# Patient Record
Sex: FEMALE | Race: OTHER | Employment: FULL TIME | URBAN - METROPOLITAN AREA
[De-identification: names, ages, dates, MRNs, and addresses within clinical notes are randomized per-mention and may not be internally consistent; named-entity substitution may affect disease eponyms.]

---

## 2023-11-09 ENCOUNTER — OFFICE VISIT (OUTPATIENT)
Dept: PRIMARY CARE CLINIC | Age: 23
End: 2023-11-09

## 2023-11-09 VITALS — DIASTOLIC BLOOD PRESSURE: 62 MMHG | WEIGHT: 189 LBS | TEMPERATURE: 98.1 F | SYSTOLIC BLOOD PRESSURE: 118 MMHG

## 2023-11-09 DIAGNOSIS — Z34.90 PREGNANCY, UNSPECIFIED GESTATIONAL AGE: ICD-10-CM

## 2023-11-09 DIAGNOSIS — L05.91 PILONIDAL CYST: ICD-10-CM

## 2023-11-09 DIAGNOSIS — F33.1 MODERATE EPISODE OF RECURRENT MAJOR DEPRESSIVE DISORDER (HCC): ICD-10-CM

## 2023-11-09 DIAGNOSIS — F90.9 ATTENTION DEFICIT HYPERACTIVITY DISORDER (ADHD), UNSPECIFIED ADHD TYPE: ICD-10-CM

## 2023-11-09 DIAGNOSIS — F41.9 ANXIETY: Primary | ICD-10-CM

## 2023-11-09 RX ORDER — BUPROPION HYDROCHLORIDE 450 MG/1
TABLET, FILM COATED, EXTENDED RELEASE ORAL EVERY MORNING
COMMUNITY
Start: 2023-07-08

## 2023-11-09 ASSESSMENT — PATIENT HEALTH QUESTIONNAIRE - PHQ9
SUM OF ALL RESPONSES TO PHQ QUESTIONS 1-9: 6
6. FEELING BAD ABOUT YOURSELF - OR THAT YOU ARE A FAILURE OR HAVE LET YOURSELF OR YOUR FAMILY DOWN: 0
2. FEELING DOWN, DEPRESSED OR HOPELESS: 0
SUM OF ALL RESPONSES TO PHQ QUESTIONS 1-9: 6
SUM OF ALL RESPONSES TO PHQ9 QUESTIONS 1 & 2: 1
5. POOR APPETITE OR OVEREATING: 1
7. TROUBLE CONCENTRATING ON THINGS, SUCH AS READING THE NEWSPAPER OR WATCHING TELEVISION: 2
SUM OF ALL RESPONSES TO PHQ QUESTIONS 1-9: 6
1. LITTLE INTEREST OR PLEASURE IN DOING THINGS: 1
4. FEELING TIRED OR HAVING LITTLE ENERGY: 2
9. THOUGHTS THAT YOU WOULD BE BETTER OFF DEAD, OR OF HURTING YOURSELF: 0
10. IF YOU CHECKED OFF ANY PROBLEMS, HOW DIFFICULT HAVE THESE PROBLEMS MADE IT FOR YOU TO DO YOUR WORK, TAKE CARE OF THINGS AT HOME, OR GET ALONG WITH OTHER PEOPLE: 1
3. TROUBLE FALLING OR STAYING ASLEEP: 0
8. MOVING OR SPEAKING SO SLOWLY THAT OTHER PEOPLE COULD HAVE NOTICED. OR THE OPPOSITE, BEING SO FIGETY OR RESTLESS THAT YOU HAVE BEEN MOVING AROUND A LOT MORE THAN USUAL: 0
SUM OF ALL RESPONSES TO PHQ QUESTIONS 1-9: 6

## 2023-11-09 ASSESSMENT — ANXIETY QUESTIONNAIRES
IF YOU CHECKED OFF ANY PROBLEMS ON THIS QUESTIONNAIRE, HOW DIFFICULT HAVE THESE PROBLEMS MADE IT FOR YOU TO DO YOUR WORK, TAKE CARE OF THINGS AT HOME, OR GET ALONG WITH OTHER PEOPLE: SOMEWHAT DIFFICULT
1. FEELING NERVOUS, ANXIOUS, OR ON EDGE: 1
GAD7 TOTAL SCORE: 6
5. BEING SO RESTLESS THAT IT IS HARD TO SIT STILL: 1
3. WORRYING TOO MUCH ABOUT DIFFERENT THINGS: 1
7. FEELING AFRAID AS IF SOMETHING AWFUL MIGHT HAPPEN: 0
6. BECOMING EASILY ANNOYED OR IRRITABLE: 1
2. NOT BEING ABLE TO STOP OR CONTROL WORRYING: 1
4. TROUBLE RELAXING: 1

## 2023-11-09 NOTE — PROGRESS NOTES
mouth every morning      lamoTRIgine (LAMICTAL) 200 MG tablet Take 1 tablet by mouth         Past Medical History:   Diagnosis Date    ADHD     Anxiety     Depression        Past Surgical History:   Procedure Laterality Date    WISDOM TOOTH EXTRACTION             Problem Relation Age of Onset    Diabetes type 2  Father     Cancer Sister     Ulcerative Colitis Brother        Social History     Socioeconomic History    Marital status: Single     Spouse name: Not on file    Number of children: Not on file    Years of education: Not on file    Highest education level: Not on file   Occupational History    Not on file   Tobacco Use    Smoking status: Never    Smokeless tobacco: Never   Substance and Sexual Activity    Alcohol use: Not Currently    Drug use: Never    Sexual activity: Not on file   Other Topics Concern    Not on file   Social History Narrative    Not on file     Social Determinants of Health     Financial Resource Strain: Not on file   Food Insecurity: Not on file   Transportation Needs: Not on file   Physical Activity: Not on file   Stress: Not on file   Social Connections: Not on file   Intimate Partner Violence: Not on file   Housing Stability: Not on file          Objective:     Constitutional:   Reviewed vitals above  Well Nourished, well developed, no distress       HENT:  Normal external nose without lesions  Resp:  Normal effort  Clear to auscultation bilaterally without rhonchi, wheezing or crackles  Cardiovascular:   On auscultation, regular rate and rhythm without murmurs, rubs or gallops  No JVD  Gastrointestinal:  Nontender, nondistended, and no masses  No hepatosplenomegaly  Musculoskeletal:  Normal Gait  All extremities without clubbing, cyanosis or edema  Skin:  No rashes on inspection  No areas of increased heat or induration on palpation  Psych:  Normal mood and affect  Normal insight and judgement    Assessment / Plan:   Patient is a 69-year-old female with a past medical history of

## 2023-11-09 NOTE — PATIENT INSTRUCTIONS
[facility] Obstetrics and Gynecology - Cortez Good DO, [address] [place] [address], [facility] [address]   Phone: [phone]

## 2023-11-20 ENCOUNTER — OFFICE VISIT (OUTPATIENT)
Dept: OBGYN CLINIC | Age: 23
End: 2023-11-20

## 2023-11-20 VITALS
OXYGEN SATURATION: 100 % | WEIGHT: 189 LBS | SYSTOLIC BLOOD PRESSURE: 115 MMHG | HEIGHT: 69 IN | BODY MASS INDEX: 27.99 KG/M2 | TEMPERATURE: 97 F | DIASTOLIC BLOOD PRESSURE: 70 MMHG | HEART RATE: 90 BPM

## 2023-11-20 DIAGNOSIS — Z01.419 WOMEN'S ANNUAL ROUTINE GYNECOLOGICAL EXAMINATION: Primary | ICD-10-CM

## 2023-11-20 DIAGNOSIS — F33.1 MODERATE EPISODE OF RECURRENT MAJOR DEPRESSIVE DISORDER (HCC): ICD-10-CM

## 2023-11-20 DIAGNOSIS — O21.9 NAUSEA AND VOMITING DURING PREGNANCY: ICD-10-CM

## 2023-11-20 DIAGNOSIS — Z76.89 ENCOUNTER TO ESTABLISH CARE WITH NEW DOCTOR: ICD-10-CM

## 2023-11-20 DIAGNOSIS — Z20.2 POSSIBLE EXPOSURE TO STD: ICD-10-CM

## 2023-11-20 DIAGNOSIS — F41.9 ANXIETY: ICD-10-CM

## 2023-11-20 DIAGNOSIS — N91.2 AMENORRHEA: ICD-10-CM

## 2023-11-20 DIAGNOSIS — Z32.01 POSITIVE PREGNANCY TEST: ICD-10-CM

## 2023-11-20 LAB
CONTROL: NORMAL
PREGNANCY TEST URINE, POC: POSITIVE

## 2023-11-20 RX ORDER — ONDANSETRON 4 MG/1
4 TABLET, FILM COATED ORAL EVERY 8 HOURS PRN
Qty: 20 TABLET | Refills: 0 | Status: SHIPPED | OUTPATIENT
Start: 2023-11-20

## 2023-11-20 ASSESSMENT — ENCOUNTER SYMPTOMS
ABDOMINAL PAIN: 0
COLOR CHANGE: 0
VOMITING: 0
NAUSEA: 1
SHORTNESS OF BREATH: 0
PHOTOPHOBIA: 0

## 2023-11-20 NOTE — PROGRESS NOTES
[facility] Ob/Gyn   Annual Exam     CC:   Chief Complaint   Patient presents with    Annual Exam     Annual: Amenorrhea LMP: 9/22/23 Last Pap 8/2022        HPI:  24 y.o. G0 presents for her gynecologic exam.   She complains of Amenorrhea. Patient's last menstrual period was 09/22/2023 (exact date). Was using [employer] for family planning. Has had a (+) home pregnancy test.   Denies any VB / minor cramping since that time. Some nausea/ only one episode of vomiting. Eats once in am and has high gag reflex with medications   Lamictal 400 mg QD, Wellbutrin 450 mg QD, L - methylfolate 10mg, Trazodone 10-20mg as needed. Has psychiatrist at home in [place]. Does have a have MTHFR -- unsure   Works at [employer], psychiatric nurse. Is doing well w/ prenatal vitamin. Previous pregnancies: None. In May 2023 she had a late period, control line didn't show up, bled heavily before she could retake preg test     Screening:  Last pap smear: 1/5/23 -- normal per patient   History of abnormal pap smears: Y   6/25/2021 -- AGC   7/21/21 -- Colpo negative   STI History: Denies      Review of Systems:   Review of Systems   Constitutional:  Negative for activity change, appetite change and fatigue. Eyes:  Negative for photophobia and visual disturbance. Respiratory:  Negative for shortness of breath. Cardiovascular:  Negative for chest pain, palpitations and leg swelling. Gastrointestinal:  Positive for nausea. Negative for abdominal pain and vomiting. Genitourinary:  Negative for difficulty urinating.        (+) absence of Menses   (+) breast tenderness    Skin:  Negative for color change. Neurological:  Negative for dizziness and numbness. Hematological:  Negative for adenopathy. Does not bruise/bleed easily. Psychiatric/Behavioral:  Negative for behavioral problems. The patient is not nervous/anxious.         Primary Care Physician: Darlyn Carmona DO    Obstetric History  OB History   No obstetric

## 2023-11-21 LAB
BACTERIA URNS QL MICRO: ABNORMAL /HPF
BILIRUB UR QL STRIP.AUTO: NEGATIVE
CANDIDA DNA VAG QL NAA+PROBE: NORMAL
CLARITY UR: CLEAR
COLOR UR: YELLOW
EPI CELLS #/AREA URNS AUTO: 7 /HPF (ref 0–5)
G VAGINALIS DNA SPEC QL NAA+PROBE: NORMAL
GLUCOSE UR STRIP.AUTO-MCNC: NEGATIVE MG/DL
HGB UR QL STRIP.AUTO: NEGATIVE
HYALINE CASTS #/AREA URNS AUTO: 0 /LPF (ref 0–8)
KETONES UR STRIP.AUTO-MCNC: NEGATIVE MG/DL
LEUKOCYTE ESTERASE UR QL STRIP.AUTO: NEGATIVE
NITRITE UR QL STRIP.AUTO: NEGATIVE
PH UR STRIP.AUTO: 6 [PH] (ref 5–8)
PROT UR STRIP.AUTO-MCNC: NEGATIVE MG/DL
RBC CLUMPS #/AREA URNS AUTO: 1 /HPF (ref 0–4)
SP GR UR STRIP.AUTO: 1.02 (ref 1–1.03)
T VAGINALIS DNA VAG QL NAA+PROBE: NORMAL
UA DIPSTICK W REFLEX MICRO PNL UR: ABNORMAL
URN SPEC COLLECT METH UR: ABNORMAL
UROBILINOGEN UR STRIP-ACNC: 1 E.U./DL
WBC #/AREA URNS AUTO: 3 /HPF (ref 0–5)

## 2023-11-22 LAB
BACTERIA UR CULT: NORMAL
C TRACH DNA CVX QL NAA+PROBE: NEGATIVE
N GONORRHOEA DNA CERV MUCUS QL NAA+PROBE: NEGATIVE

## 2023-11-29 ENCOUNTER — TELEPHONE (OUTPATIENT)
Dept: OBGYN CLINIC | Age: 23
End: 2023-11-29

## 2023-11-29 DIAGNOSIS — O21.9 NAUSEA AND VOMITING DURING PREGNANCY: Primary | ICD-10-CM

## 2023-11-29 RX ORDER — DOXYLAMINE SUCCINATE AND PYRIDOXINE HYDROCHLORIDE, DELAYED RELEASE TABLETS 10 MG/10 MG 10; 10 MG/1; MG/1
1 TABLET, DELAYED RELEASE ORAL 3 TIMES DAILY PRN
Qty: 60 TABLET | Refills: 2 | Status: SHIPPED | OUTPATIENT
Start: 2023-11-29

## 2023-11-29 NOTE — TELEPHONE ENCOUNTER
I think she needs to try diclegis   It is expensive but worth it   Ill place rx for this now     An alternative would be phenergan suppositories if she does not / cannot fill diclegis

## 2023-11-29 NOTE — TELEPHONE ENCOUNTER
Patient calling with continued nausea. She has the zofran and is able to swallow it and keep it down, however when she tries to take her scheduled medications she cannot keep them down d/t the pill is larger. She is wanting to know if there is anything else that she can take for the nausea? She is currently drinking a boost to help since she is so nauseated. Once she is able to add meals back in she will. Routing to Dr. Christina Connelly.

## 2023-12-29 ENCOUNTER — INITIAL PRENATAL (OUTPATIENT)
Dept: OBGYN CLINIC | Age: 23
End: 2023-12-29

## 2023-12-29 VITALS
TEMPERATURE: 97.9 F | DIASTOLIC BLOOD PRESSURE: 65 MMHG | HEART RATE: 79 BPM | OXYGEN SATURATION: 98 % | BODY MASS INDEX: 27.91 KG/M2 | WEIGHT: 189 LBS | SYSTOLIC BLOOD PRESSURE: 118 MMHG

## 2023-12-29 DIAGNOSIS — R06.02 SHORTNESS OF BREATH DUE TO PREGNANCY: ICD-10-CM

## 2023-12-29 DIAGNOSIS — O26.899 SHORTNESS OF BREATH DUE TO PREGNANCY: ICD-10-CM

## 2023-12-29 DIAGNOSIS — F41.9 ANXIETY: ICD-10-CM

## 2023-12-29 DIAGNOSIS — F33.1 MODERATE EPISODE OF RECURRENT MAJOR DEPRESSIVE DISORDER (HCC): ICD-10-CM

## 2023-12-29 DIAGNOSIS — Z34.01 PRENATAL CARE, FIRST PREGNANCY IN FIRST TRIMESTER: Primary | ICD-10-CM

## 2023-12-29 DIAGNOSIS — O21.9 NAUSEA AND VOMITING DURING PREGNANCY: ICD-10-CM

## 2023-12-30 LAB
ABO + RH BLD: NORMAL
AMPHETAMINES UR QL SCN>1000 NG/ML: NORMAL
BARBITURATES UR QL SCN>200 NG/ML: NORMAL
BASOPHILS # BLD: 0 K/UL (ref 0–0.2)
BASOPHILS NFR BLD: 0.5 %
BENZODIAZ UR QL SCN>200 NG/ML: NORMAL
BLD GP AB SCN SERPL QL: NORMAL
BUPRENORPHINE+NOR UR QL SCN: NORMAL
CANNABINOIDS UR QL SCN>50 NG/ML: NORMAL
COCAINE UR QL SCN: NORMAL
DEPRECATED RDW RBC AUTO: 13.6 % (ref 12.4–15.4)
DRUG SCREEN COMMENT UR-IMP: NORMAL
EOSINOPHIL # BLD: 0.1 K/UL (ref 0–0.6)
EOSINOPHIL NFR BLD: 1.2 %
FENTANYL SCREEN, URINE: NORMAL
HBV SURFACE AG SERPL QL IA: NORMAL
HCT VFR BLD AUTO: 35.4 % (ref 36–48)
HGB BLD-MCNC: 12.2 G/DL (ref 12–16)
HIV 1+2 AB+HIV1 P24 AG SERPL QL IA: NORMAL
HIV 2 AB SERPL QL IA: NORMAL
HIV1 AB SERPL QL IA: NORMAL
HIV1 P24 AG SERPL QL IA: NORMAL
LYMPHOCYTES # BLD: 1 K/UL (ref 1–5.1)
LYMPHOCYTES NFR BLD: 16.3 %
MCH RBC QN AUTO: 28.6 PG (ref 26–34)
MCHC RBC AUTO-ENTMCNC: 34.5 G/DL (ref 31–36)
MCV RBC AUTO: 82.9 FL (ref 80–100)
METHADONE UR QL SCN>300 NG/ML: NORMAL
MONOCYTES # BLD: 0.5 K/UL (ref 0–1.3)
MONOCYTES NFR BLD: 8.9 %
NEUTROPHILS # BLD: 4.5 K/UL (ref 1.7–7.7)
NEUTROPHILS NFR BLD: 73.1 %
OPIATES UR QL SCN>300 NG/ML: NORMAL
OXYCODONE UR QL SCN: NORMAL
PCP UR QL SCN>25 NG/ML: NORMAL
PH UR STRIP: 8 [PH]
PLATELET # BLD AUTO: 203 K/UL (ref 135–450)
PMV BLD AUTO: 9 FL (ref 5–10.5)
RBC # BLD AUTO: 4.27 M/UL (ref 4–5.2)
REAGIN+T PALLIDUM IGG+IGM SERPL-IMP: NORMAL
RUBV IGG SERPL IA-ACNC: 46.6 IU/ML
VZV IGG SER QL IA: NORMAL
WBC # BLD AUTO: 6.1 K/UL (ref 4–11)

## 2023-12-31 LAB
HCV AB S/CO SERPL IA: 0.05 IV
HCV AB SERPL QL IA: NEGATIVE

## 2024-01-19 SDOH — ECONOMIC STABILITY: FOOD INSECURITY: WITHIN THE PAST 12 MONTHS, YOU WORRIED THAT YOUR FOOD WOULD RUN OUT BEFORE YOU GOT MONEY TO BUY MORE.: NEVER TRUE

## 2024-01-19 SDOH — ECONOMIC STABILITY: TRANSPORTATION INSECURITY
IN THE PAST 12 MONTHS, HAS LACK OF TRANSPORTATION KEPT YOU FROM MEETINGS, WORK, OR FROM GETTING THINGS NEEDED FOR DAILY LIVING?: NO

## 2024-01-19 SDOH — ECONOMIC STABILITY: INCOME INSECURITY: HOW HARD IS IT FOR YOU TO PAY FOR THE VERY BASICS LIKE FOOD, HOUSING, MEDICAL CARE, AND HEATING?: NOT VERY HARD

## 2024-01-19 SDOH — ECONOMIC STABILITY: HOUSING INSECURITY
IN THE LAST 12 MONTHS, WAS THERE A TIME WHEN YOU DID NOT HAVE A STEADY PLACE TO SLEEP OR SLEPT IN A SHELTER (INCLUDING NOW)?: NO

## 2024-01-19 SDOH — ECONOMIC STABILITY: FOOD INSECURITY: WITHIN THE PAST 12 MONTHS, THE FOOD YOU BOUGHT JUST DIDN'T LAST AND YOU DIDN'T HAVE MONEY TO GET MORE.: NEVER TRUE

## 2024-01-22 ENCOUNTER — ROUTINE PRENATAL (OUTPATIENT)
Dept: OBGYN CLINIC | Age: 24
End: 2024-01-22

## 2024-01-22 VITALS
DIASTOLIC BLOOD PRESSURE: 76 MMHG | WEIGHT: 179 LBS | HEIGHT: 70 IN | TEMPERATURE: 97.2 F | BODY MASS INDEX: 25.62 KG/M2 | SYSTOLIC BLOOD PRESSURE: 120 MMHG | HEART RATE: 82 BPM

## 2024-01-22 DIAGNOSIS — Z34.02 PRENATAL CARE, FIRST PREGNANCY IN SECOND TRIMESTER: Primary | ICD-10-CM

## 2024-01-22 DIAGNOSIS — F41.9 ANXIETY: ICD-10-CM

## 2024-01-22 DIAGNOSIS — O26.899 SHORTNESS OF BREATH DUE TO PREGNANCY: ICD-10-CM

## 2024-01-22 DIAGNOSIS — R06.02 SHORTNESS OF BREATH DUE TO PREGNANCY: ICD-10-CM

## 2024-01-22 DIAGNOSIS — F33.1 MODERATE EPISODE OF RECURRENT MAJOR DEPRESSIVE DISORDER (HCC): ICD-10-CM

## 2024-01-22 DIAGNOSIS — O21.9 NAUSEA AND VOMITING DURING PREGNANCY: ICD-10-CM

## 2024-01-22 PROCEDURE — 0502F SUBSEQUENT PRENATAL CARE: CPT | Performed by: OBSTETRICS & GYNECOLOGY
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
## 2024-02-06 ENCOUNTER — TELEPHONE (OUTPATIENT)
Dept: PRIMARY CARE CLINIC | Age: 24
End: 2024-02-06

## 2024-02-06 NOTE — TELEPHONE ENCOUNTER
Patient call stated she having pain patient is 18 weeks pregnant with a pilonidal cyst and think it maybe inflammation please advise patient             [phone] (home)

## 2024-02-12 ENCOUNTER — OFFICE VISIT (OUTPATIENT)
Dept: PRIMARY CARE CLINIC | Age: 24
End: 2024-02-12
Payer: COMMERCIAL

## 2024-02-12 VITALS
HEART RATE: 86 BPM | BODY MASS INDEX: 26.81 KG/M2 | SYSTOLIC BLOOD PRESSURE: 112 MMHG | DIASTOLIC BLOOD PRESSURE: 70 MMHG | OXYGEN SATURATION: 99 % | WEIGHT: 181 LBS | HEIGHT: 69 IN | TEMPERATURE: 98.1 F

## 2024-02-12 DIAGNOSIS — L05.91 PILONIDAL CYST: Primary | ICD-10-CM

## 2024-02-12 PROCEDURE — 99213 OFFICE O/P EST LOW 20 MIN: CPT

## 2024-02-12 NOTE — PROGRESS NOTES
PROGRESS NOTE   [facility]                                  [address], [address], [place] [address]         Phone: [phone]    Date of Service:  2/12/2024     Patient ID: Kathya Causey is a 23 y.o. female      Subjective:     CC: ***    HPI    5. Pilonidal cyst  - Stable  - Not actively draining or painful  - Given her pregnancy we will hold off on referral to general surgery for excision at this time.    ROS:    Negative expect for pertinent positives listed in the HPI        There were no vitals filed for this visit.    Allergies:  Patient has no known allergies.    No outpatient medications have been marked as taking for the 2/12/24 encounter (Appointment) with Alyssa Gibson DO.         Objective:   {physical exam FCMRP:51727}    Constitutional:   Reviewed vitals above  Well Nourished, well developed, no distress       HENT:  Normal external nose without lesions  Bilateral TMs translucent with normal light reflex and bony landmarks  Normal oropharynx without erythema or exudate  Normal nasal mucosa without swelling or erythema  Neck:  Symmetric and without masses  No thyromegaly  Resp:  Normal effort  Clear to auscultation bilaterally without rhonchi, wheezing or crackles  Cardiovascular:  On auscultation, regular rate and rhythm without murmurs, rubs or gallops  No JVD  Gastrointestinal:  Nontender, nondistended, and no masses  No hepatosplenomegaly  Musculoskeletal:  Normal Gait  All extremities without clubbing, cyanosis or edema  Skin:  No rashes on inspection  No areas of increased heat or induration on palpation  Psych:  Normal mood and affect  Normal insight and judgement        Assessment / Plan:     There are no diagnoses linked to this encounter.      Health Maitenance: ***    Health care decision maker:  {health care decision maker allegra:64818}  Vot-ER:  {vo+ER allegra:21889}      {resident attestation to

## 2024-02-12 NOTE — PROGRESS NOTES
PROGRESS NOTE   [facility]                                  [address], [address], [place] [address]         Phone: [phone]    Date of Service:  2/12/2024     Patient ID: Kathya Causey is a 23 y.o. female    A physical examination was conducted with the presence of a chaperone, a medical assistant Soraida, in the office.     Subjective:     CC:   Chief Complaint   Patient presents with    Follow-up         HPI  Alyssa Causey 23 y.o.  female with past medical history of pilonidal cyst presents to the office with discomfort in the coccygeal area.     The patient reported being 19 weeks pregnant.     Coccygeal Discomfort  The patient reported a history of a pilonidal cyst.  The last flare-up involving cyst infection occurred in August 2023, and it was drained at that time. A prior drainage was performed in Winter 2022.  Recurrent pain and discomfort began approximately 10 days ago.  The pain is described as throbbing, with an intensity of 4-5/10. Certain sitting positions or leaning against something exacerbate the pain, while relieving pressure on the affected area alleviates the pain.  The patient denies any discharge, fever, chills, shortness of breath, cough, chest pain, abdominal pain, or symptoms of vomiting or diarrhea or other symptoms.   Nausea has been reported, attributed to pregnancy.  Patient denies any injury or trauma.       ROS:    Pertinent positives and negatives in HPI.         Vitals:    02/12/24 0927   BP: 112/70   Site: Right Upper Arm   Position: Sitting   Cuff Size: Medium Adult   Pulse: 86   Temp: 98.1 °F (36.7 °C)   SpO2: 99%   Weight: 82.1 kg (181 lb)   Height: 1.753 m (5' 9.02\")       Allergies:  Patient has no known allergies.    Outpatient Medications Marked as Taking for the 2/12/24 encounter (Office Visit) with Jean Pierre Pereira, DO   Medication Sig Dispense Refill    doxylamine-pyridoxine 10-10 MG

## 2024-02-26 ENCOUNTER — ROUTINE PRENATAL (OUTPATIENT)
Dept: OBGYN CLINIC | Age: 24
End: 2024-02-26

## 2024-02-26 VITALS
TEMPERATURE: 97 F | BODY MASS INDEX: 26.72 KG/M2 | HEIGHT: 69 IN | OXYGEN SATURATION: 97 % | DIASTOLIC BLOOD PRESSURE: 70 MMHG | SYSTOLIC BLOOD PRESSURE: 118 MMHG | HEART RATE: 85 BPM | WEIGHT: 180.4 LBS

## 2024-02-26 DIAGNOSIS — O26.899 SHORTNESS OF BREATH DUE TO PREGNANCY: ICD-10-CM

## 2024-02-26 DIAGNOSIS — F41.9 ANXIETY: ICD-10-CM

## 2024-02-26 DIAGNOSIS — Z34.02 PRENATAL CARE, FIRST PREGNANCY IN SECOND TRIMESTER: Primary | ICD-10-CM

## 2024-02-26 DIAGNOSIS — F33.1 MODERATE EPISODE OF RECURRENT MAJOR DEPRESSIVE DISORDER (HCC): ICD-10-CM

## 2024-02-26 DIAGNOSIS — O21.9 NAUSEA AND VOMITING DURING PREGNANCY: ICD-10-CM

## 2024-02-26 DIAGNOSIS — R06.02 SHORTNESS OF BREATH DUE TO PREGNANCY: ICD-10-CM

## 2024-02-26 PROCEDURE — 0502F SUBSEQUENT PRENATAL CARE: CPT | Performed by: OBSTETRICS & GYNECOLOGY
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
## 2024-02-27 ENCOUNTER — INITIAL CONSULT (OUTPATIENT)
Dept: SURGERY | Age: 24
End: 2024-02-27

## 2024-02-27 VITALS
DIASTOLIC BLOOD PRESSURE: 70 MMHG | WEIGHT: 182.6 LBS | SYSTOLIC BLOOD PRESSURE: 118 MMHG | BODY MASS INDEX: 27.05 KG/M2 | HEIGHT: 69 IN

## 2024-02-27 DIAGNOSIS — L02.31 ABSCESS OF GLUTEAL CLEFT: Primary | ICD-10-CM

## 2024-02-28 NOTE — PROGRESS NOTES
New Patient Consult    [facility]  [facility] General Surgery [place]  Bartolo Camacho MD    [address], [address]  [place], [place] [address]  Phone: [phone]  Fax: [phone]    Alyssa Causey   YOB: 2000    Date of Visit:  2024    Alyssa Gonzalez DO    HPI:   Abscess: 24 y/o WF referred by Ob/Gyn (Dr Sanderson)  - she is currently 21 weeks pregnant - with h/o two prior episodes of a focal abscess on the left buttock adjacent to the kylie cleft.  Was last seen at an Urgent Care, had an I&D, and was told she had a pilonidal cyst.  This area has fully healed but she is referred to assess for possible need for surgical intervention in the future.  She currently has no complaints of pain or drainage at the area.    No Known Allergies  Outpatient Medications Marked as Taking for the 24 encounter (Initial consult) with Bartolo Camacho MD   Medication Sig Dispense Refill    doxylamine-pyridoxine 10-10 MG TBEC Take 1 tablet by mouth 3 times daily as needed (nausea) Start with 1 tablet at night and in am, add afternoon dose as needed 60 tablet 2    buPROPion HCl ER, XL, 450 MG TB24 Take by mouth every morning      LAMOTRIGINE PO Take 400 mg by mouth daily         Past Medical History:   Diagnosis Date    ADHD     Anxiety     Depression      Past Surgical History:   Procedure Laterality Date    WISDOM TOOTH EXTRACTION       Family History   Problem Relation Age of Onset    Diabetes type 2  Father     Cancer Sister     Ulcerative Colitis Brother      Social History     Socioeconomic History    Marital status:      Spouse name: Not on file    Number of children: Not on file    Years of education: Not on file    Highest education level: Not on file   Occupational History    Not on file   Tobacco Use    Smoking status: Never    Smokeless tobacco: Never   Substance and Sexual Activity    Alcohol use: Not Currently    Drug use: Never    Sexual activity: Not on file

## 2024-03-22 ENCOUNTER — ROUTINE PRENATAL (OUTPATIENT)
Dept: OBGYN CLINIC | Age: 24
End: 2024-03-22

## 2024-03-22 VITALS
HEART RATE: 90 BPM | DIASTOLIC BLOOD PRESSURE: 64 MMHG | TEMPERATURE: 97.9 F | WEIGHT: 186 LBS | BODY MASS INDEX: 27.45 KG/M2 | SYSTOLIC BLOOD PRESSURE: 118 MMHG

## 2024-03-22 DIAGNOSIS — O21.9 NAUSEA AND VOMITING DURING PREGNANCY: ICD-10-CM

## 2024-03-22 DIAGNOSIS — F33.1 MODERATE EPISODE OF RECURRENT MAJOR DEPRESSIVE DISORDER (HCC): ICD-10-CM

## 2024-03-22 DIAGNOSIS — Z34.02 PRENATAL CARE, FIRST PREGNANCY IN SECOND TRIMESTER: Primary | ICD-10-CM

## 2024-03-22 DIAGNOSIS — F41.9 ANXIETY: ICD-10-CM
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
## 2024-04-26 ENCOUNTER — ROUTINE PRENATAL (OUTPATIENT)
Dept: OBGYN CLINIC | Age: 24
End: 2024-04-26

## 2024-04-26 VITALS
HEART RATE: 76 BPM | TEMPERATURE: 98.8 F | DIASTOLIC BLOOD PRESSURE: 70 MMHG | BODY MASS INDEX: 28.49 KG/M2 | SYSTOLIC BLOOD PRESSURE: 116 MMHG | WEIGHT: 193 LBS

## 2024-04-26 DIAGNOSIS — F41.9 ANXIETY: ICD-10-CM

## 2024-04-26 DIAGNOSIS — O26.899 SHORTNESS OF BREATH DUE TO PREGNANCY: ICD-10-CM

## 2024-04-26 DIAGNOSIS — Z23 NEED FOR DIPHTHERIA-TETANUS-PERTUSSIS (TDAP) VACCINE: ICD-10-CM

## 2024-04-26 DIAGNOSIS — O21.9 NAUSEA AND VOMITING DURING PREGNANCY: ICD-10-CM

## 2024-04-26 DIAGNOSIS — Z34.93 PRENATAL CARE IN THIRD TRIMESTER: Primary | ICD-10-CM

## 2024-04-26 DIAGNOSIS — R06.02 SHORTNESS OF BREATH DUE TO PREGNANCY: ICD-10-CM

## 2024-04-26 DIAGNOSIS — F33.1 MODERATE EPISODE OF RECURRENT MAJOR DEPRESSIVE DISORDER (HCC): ICD-10-CM

## 2024-04-26 LAB
BASOPHILS # BLD: 0 K/UL (ref 0–0.2)
BASOPHILS NFR BLD: 0.6 %
DEPRECATED RDW RBC AUTO: 13.3 % (ref 12.4–15.4)
EOSINOPHIL # BLD: 0.1 K/UL (ref 0–0.6)
EOSINOPHIL NFR BLD: 0.9 %
HCT VFR BLD AUTO: 32.1 % (ref 36–48)
HGB BLD-MCNC: 11.1 G/DL (ref 12–16)
LYMPHOCYTES # BLD: 1 K/UL (ref 1–5.1)
LYMPHOCYTES NFR BLD: 12.9 %
MCH RBC QN AUTO: 29.7 PG (ref 26–34)
MCHC RBC AUTO-ENTMCNC: 34.7 G/DL (ref 31–36)
MCV RBC AUTO: 85.8 FL (ref 80–100)
MONOCYTES # BLD: 0.5 K/UL (ref 0–1.3)
MONOCYTES NFR BLD: 6.4 %
NEUTROPHILS # BLD: 6 K/UL (ref 1.7–7.7)
NEUTROPHILS NFR BLD: 79.2 %
PLATELET # BLD AUTO: 200 K/UL (ref 135–450)
PMV BLD AUTO: 8.7 FL (ref 5–10.5)
RBC # BLD AUTO: 3.74 M/UL (ref 4–5.2)
WBC # BLD AUTO: 7.6 K/UL (ref 4–11)

## 2024-04-26 NOTE — PROGRESS NOTES
.eastobgynglucose    Patient started drink at 10:17am  and finished at 10:20am   .  Patient tolerated drink well.   1 green tube drawn at 11:20am.  # 50 grams of Glucola.  No complain of nausea and vomiting at this time, will continue to monitor.     10:44 AM Given Tdap (Adacel) vaccine 0.5mL IM  Site:Right deltoid.   Lot # [id number]  Expiration Date:  05/082026  NDC # 99226-590-76 .  Patient tolerated well. No reaction noted after 20 minutes. VIS sheet provided.  Administered by: Vangie Rocha   
precautions reviewed   Return in about 2 weeks (around 5/10/2024) for Return OB visit.    Aby Sanderson DO

## 2024-04-27 LAB — GLUCOSE 1H P 50 G GLC PO SERPL-MCNC: 99 MG/DL

## 2024-05-07 ENCOUNTER — ROUTINE PRENATAL (OUTPATIENT)
Dept: OBGYN CLINIC | Age: 24
End: 2024-05-07

## 2024-05-07 VITALS
TEMPERATURE: 98 F | DIASTOLIC BLOOD PRESSURE: 70 MMHG | WEIGHT: 195 LBS | SYSTOLIC BLOOD PRESSURE: 112 MMHG | BODY MASS INDEX: 28.78 KG/M2 | HEART RATE: 111 BPM

## 2024-05-07 DIAGNOSIS — O21.9 NAUSEA AND VOMITING DURING PREGNANCY: ICD-10-CM

## 2024-05-07 DIAGNOSIS — Z34.93 PRENATAL CARE IN THIRD TRIMESTER: Primary | ICD-10-CM

## 2024-05-07 DIAGNOSIS — O26.899 SHORTNESS OF BREATH DUE TO PREGNANCY: ICD-10-CM

## 2024-05-07 DIAGNOSIS — R06.02 SHORTNESS OF BREATH DUE TO PREGNANCY: ICD-10-CM

## 2024-05-07 DIAGNOSIS — F33.1 MODERATE EPISODE OF RECURRENT MAJOR DEPRESSIVE DISORDER (HCC): ICD-10-CM

## 2024-05-07 DIAGNOSIS — F41.9 ANXIETY: ICD-10-CM

## 2024-05-07 DIAGNOSIS — L30.9 ECZEMA, UNSPECIFIED TYPE: ICD-10-CM

## 2024-05-07 PROCEDURE — 0502F SUBSEQUENT PRENATAL CARE: CPT | Performed by: OBSTETRICS & GYNECOLOGY
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
## 2024-05-09 ENCOUNTER — TELEPHONE (OUTPATIENT)
Dept: OBGYN CLINIC | Age: 24
End: 2024-05-09

## 2024-05-09 NOTE — TELEPHONE ENCOUNTER
Pt was called and is aware of Physician response. Recent Tdap was done 2 weeks ago and 3 yrs prior to that. She has placed atb ointment. DONE
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
## 2024-05-16 ENCOUNTER — TELEPHONE (OUTPATIENT)
Dept: OBGYN CLINIC | Age: 24
End: 2024-05-16

## 2024-05-21 ENCOUNTER — ROUTINE PRENATAL (OUTPATIENT)
Dept: OBGYN CLINIC | Age: 24
End: 2024-05-21

## 2024-05-21 VITALS
BODY MASS INDEX: 29.92 KG/M2 | TEMPERATURE: 97.8 F | OXYGEN SATURATION: 97 % | HEIGHT: 69 IN | WEIGHT: 202 LBS | SYSTOLIC BLOOD PRESSURE: 128 MMHG | DIASTOLIC BLOOD PRESSURE: 68 MMHG | HEART RATE: 107 BPM

## 2024-05-21 DIAGNOSIS — O21.9 NAUSEA AND VOMITING DURING PREGNANCY: ICD-10-CM

## 2024-05-21 DIAGNOSIS — F33.1 MODERATE EPISODE OF RECURRENT MAJOR DEPRESSIVE DISORDER (HCC): ICD-10-CM

## 2024-05-21 DIAGNOSIS — Z34.93 PRENATAL CARE IN THIRD TRIMESTER: Primary | ICD-10-CM

## 2024-05-21 PROCEDURE — 0502F SUBSEQUENT PRENATAL CARE: CPT | Performed by: OBSTETRICS & GYNECOLOGY

## 2024-05-21 NOTE — PROGRESS NOTES
Return OB Office Visit    CC:   Chief Complaint   Patient presents with    Routine Prenatal Visit       HPI:  Pt seen and examined. No concerns/complaints. Denies VB, LOF, ctx. +FM. Some increased discharge and maybe occasional Hunker-Nash as well.     Objective:  /68   Pulse (!) 107   Temp 97.8 °F (36.6 °C) (Temporal)   Ht 1.753 m (5' 9.02\")   Wt 91.6 kg (202 lb)   LMP 2023 (Exact Date)   SpO2 97%   BMI 29.81 kg/m²   Gen: AO, NAD  Abd: Soft, NT  FHT: 147  FH: 32cm, unk by Leopolds    Assessment/Plan:  23 y.o.  at 33w1d (Estimated Date of Delivery: 24) presents for CARLOS appointment:      Diagnosis Orders   1. Prenatal care in third trimester        2. Nausea and vomiting during pregnancy        3. Moderate episode of recurrent major depressive disorder (HCC)          Doing well today, routine care  - FWB reassuring on doptones today  - labor precautions discussed, qeustions answered  - moods stable for now    Dispo: RTC in 2 weeks  Macie Roca MD

## 2024-05-29 ENCOUNTER — TELEPHONE (OUTPATIENT)
Dept: OBGYN CLINIC | Age: 24
End: 2024-05-29

## 2024-05-29 NOTE — TELEPHONE ENCOUNTER
34w2d. Pt calling because she has had SOB and felt slightly dizzy. This has now passed . She was at work when it happened and checked her B/P @ 147/79. She states her baseline is 111/70's. She waited 10 minutes and rechecked and got the same. +FM, No LOF. She states she had cramping like contractions in the am for about an hour but none since. She denies nausea , and has been able to eat ok. Pt advised to monitor B/P and hydrate well with water. Advised if B/P staying > 140/90 and feeling dizzy or headache would need Triage evaluation. Pt ok with monitoring for now and will call back if episode occurs again.

## 2024-05-30 ENCOUNTER — HOSPITAL ENCOUNTER (OUTPATIENT)
Age: 24
Discharge: HOME | End: 2024-05-30
Attending: OBSTETRICS & GYNECOLOGY | Admitting: OBSTETRICS & GYNECOLOGY
Payer: COMMERCIAL

## 2024-05-30 VITALS
SYSTOLIC BLOOD PRESSURE: 131 MMHG | RESPIRATION RATE: 18 BRPM | DIASTOLIC BLOOD PRESSURE: 68 MMHG | OXYGEN SATURATION: 97 % | HEART RATE: 92 BPM | TEMPERATURE: 98.3 F

## 2024-05-30 LAB
ALBUMIN SERPL-MCNC: 3.5 G/DL (ref 3.4–5)
ALBUMIN/GLOB SERPL: 1.3 {RATIO} (ref 1.1–2.2)
ALP SERPL-CCNC: 101 U/L (ref 40–129)
ALT SERPL-CCNC: 12 U/L (ref 10–40)
ANION GAP SERPL CALCULATED.3IONS-SCNC: 15 MMOL/L (ref 3–16)
AST SERPL-CCNC: 18 U/L (ref 15–37)
BASOPHILS # BLD: 0 K/UL (ref 0–0.2)
BASOPHILS NFR BLD: 0.3 %
BILIRUB SERPL-MCNC: 0.6 MG/DL (ref 0–1)
BUN SERPL-MCNC: 6 MG/DL (ref 7–20)
CALCIUM SERPL-MCNC: 9 MG/DL (ref 8.3–10.6)
CHLORIDE SERPL-SCNC: 99 MMOL/L (ref 99–110)
CO2 SERPL-SCNC: 19 MMOL/L (ref 21–32)
CREAT SERPL-MCNC: <0.5 MG/DL (ref 0.6–1.1)
DEPRECATED RDW RBC AUTO: 13.1 % (ref 12.4–15.4)
EOSINOPHIL # BLD: 0.1 K/UL (ref 0–0.6)
EOSINOPHIL NFR BLD: 1 %
GFR SERPLBLD CREATININE-BSD FMLA CKD-EPI: >90 ML/MIN/{1.73_M2}
GLUCOSE SERPL-MCNC: 79 MG/DL (ref 70–99)
HCT VFR BLD AUTO: 30.2 % (ref 36–48)
HGB BLD-MCNC: 10.3 G/DL (ref 12–16)
LYMPHOCYTES # BLD: 1.5 K/UL (ref 1–5.1)
LYMPHOCYTES NFR BLD: 12.2 %
MCH RBC QN AUTO: 27.6 PG (ref 26–34)
MCHC RBC AUTO-ENTMCNC: 34.1 G/DL (ref 31–36)
MCV RBC AUTO: 80.7 FL (ref 80–100)
MONOCYTES # BLD: 1 K/UL (ref 0–1.3)
MONOCYTES NFR BLD: 8.3 %
NEUTROPHILS # BLD: 9.5 K/UL (ref 1.7–7.7)
NEUTROPHILS NFR BLD: 78.2 %
PLATELET # BLD AUTO: 223 K/UL (ref 135–450)
PMV BLD AUTO: 8.1 FL (ref 5–10.5)
POTASSIUM SERPL-SCNC: 3.6 MMOL/L (ref 3.5–5.1)
PROT SERPL-MCNC: 6.2 G/DL (ref 6.4–8.2)
RBC # BLD AUTO: 3.74 M/UL (ref 4–5.2)
SODIUM SERPL-SCNC: 133 MMOL/L (ref 136–145)
URATE SERPL-MCNC: 3.9 MG/DL (ref 2.6–6)
WBC # BLD AUTO: 12.1 K/UL (ref 4–11)

## 2024-05-30 PROCEDURE — 84550 ASSAY OF BLOOD/URIC ACID: CPT

## 2024-05-30 PROCEDURE — 59025 FETAL NON-STRESS TEST: CPT | Performed by: OBSTETRICS & GYNECOLOGY

## 2024-05-30 PROCEDURE — 99214 OFFICE O/P EST MOD 30 MIN: CPT | Performed by: OBSTETRICS & GYNECOLOGY

## 2024-05-30 PROCEDURE — 80053 COMPREHEN METABOLIC PANEL: CPT

## 2024-05-30 PROCEDURE — 85025 COMPLETE CBC W/AUTO DIFF WBC: CPT

## 2024-05-30 PROCEDURE — 99211 OFF/OP EST MAY X REQ PHY/QHP: CPT

## 2024-05-30 PROCEDURE — 36415 COLL VENOUS BLD VENIPUNCTURE: CPT

## 2024-05-30 RX ORDER — DIPHENHYDRAMINE HCL 50 MG
50 CAPSULE ORAL EVERY 6 HOURS PRN
COMMUNITY

## 2024-05-30 RX ORDER — ACETAMINOPHEN 325 MG/1
650 TABLET ORAL EVERY 6 HOURS PRN
COMMUNITY

## 2024-05-30 RX ORDER — FERROUS SULFATE 325(65) MG
325 TABLET ORAL
Qty: 90 TABLET | Refills: 1 | Status: SHIPPED | OUTPATIENT
Start: 2024-05-30

## 2024-05-30 NOTE — H&P
Obstetrics Triage History and Physical    CC:   Chief Complaint   Patient presents with    Abdominal Pain     Right upper quadrant pain since 300 constant  7/10 on pain scale       HPI: Alyssa Causey is a 23 y.o.  at 34w3d who presents with complaints of upper abdominal pain.     Reports earlier today she was having upper abdomen (just above and to the right of her umbilicus) pain.  Worse with movement as well as with fetal movement.  +FM.  Denies VB, LOF, contractions.  Denies chest pain, shortness of breath, fever, chills, nausea, vomiting.  Denies headache, vision changes, RUQ pain, increased LE edema.      Reports an elevated BP yesterday, however improved on repeat.     Review of Systems: The following ROS was otherwise negative, except as noted in the HPI: constitutional, HEENT, respiratory, cardiovascular, gastrointestinal, genitourinary, skin, musculoskeletal, neurological, psych.     OBGYN Provider : BRAXTON Sanderson DO     Obstetrical History:  OB History    Para Term  AB Living   1 0 0 0 0 0   SAB IAB Ectopic Molar Multiple Live Births   0 0 0 0 0 0      # Outcome Date GA Lbr Jeff/2nd Weight Sex Delivery Anes PTL Lv   1 Current                Past Medical History:   Past Medical History:   Diagnosis Date    ADHD     Anxiety     Depression        Medications:  Prior to Admission medications    Medication Sig Start Date End Date Taking? Authorizing Provider   acetaminophen (TYLENOL) 325 MG tablet Take 2 tablets by mouth every 6 hours as needed for Pain   Yes Yi Walden MD   diphenhydrAMINE (BENADRYL) 50 MG capsule Take 1 capsule by mouth every 6 hours as needed for Itching   Yes ProviderYi MD   ferrous sulfate (IRON 325) 325 (65 Fe) MG tablet Take 1 tablet by mouth daily (with breakfast) 24  Yes Maria Victoria Fung DO   Prenatal MV-Min-Fe Fum-FA-DHA (PRENATAL 1 PO) Take by mouth    ProviderYi MD   doxylamine-pyridoxine 10-10 MG TBEC Take 1 tablet by 
1

## 2024-05-30 NOTE — TELEPHONE ENCOUNTER
Pt calling today because she has been having upper right sided sharp abdominal pain down to umbilicus area. Pt states she has been nauseated. She reports she has been hydrating well today. +FM and no LOF. She is concerned that despite rest and hydration the pain is not getting better. She reports it is 7/10 at times.   Please advise if triage evaluation is recommended

## 2024-05-30 NOTE — TELEPHONE ENCOUNTER
Attempted to call. Left detailed message to report to triage for evaluation. Called L&D to make aware of send over.

## 2024-05-31 NOTE — PROGRESS NOTES
Fetal Heart Monitor Interpretation:   Baseline: 125  Variability: moderate in degree  Accelerations: Present  Decelerations: Absent                  [place]: Contractions are absent     Category 1    Reactive: Yes      Maria Victoria Fung DO

## 2024-06-05 ENCOUNTER — HOSPITAL ENCOUNTER (OUTPATIENT)
Age: 24
Discharge: HOME | End: 2024-06-05
Attending: OBSTETRICS & GYNECOLOGY | Admitting: OBSTETRICS & GYNECOLOGY
Payer: COMMERCIAL

## 2024-06-05 ENCOUNTER — TELEPHONE (OUTPATIENT)
Dept: OBGYN CLINIC | Age: 24
End: 2024-06-05

## 2024-06-05 ENCOUNTER — ROUTINE PRENATAL (OUTPATIENT)
Dept: OBGYN CLINIC | Age: 24
End: 2024-06-05

## 2024-06-05 VITALS
HEART RATE: 90 BPM | OXYGEN SATURATION: 96 % | WEIGHT: 204 LBS | SYSTOLIC BLOOD PRESSURE: 120 MMHG | BODY MASS INDEX: 30.21 KG/M2 | DIASTOLIC BLOOD PRESSURE: 74 MMHG | HEIGHT: 69 IN | TEMPERATURE: 98.2 F | RESPIRATION RATE: 22 BRPM

## 2024-06-05 VITALS
SYSTOLIC BLOOD PRESSURE: 110 MMHG | TEMPERATURE: 98 F | OXYGEN SATURATION: 99 % | DIASTOLIC BLOOD PRESSURE: 65 MMHG | HEART RATE: 110 BPM | WEIGHT: 204 LBS | BODY MASS INDEX: 30.11 KG/M2

## 2024-06-05 DIAGNOSIS — Z34.93 PRENATAL CARE IN THIRD TRIMESTER: Primary | ICD-10-CM

## 2024-06-05 DIAGNOSIS — F33.1 MODERATE EPISODE OF RECURRENT MAJOR DEPRESSIVE DISORDER (HCC): ICD-10-CM

## 2024-06-05 DIAGNOSIS — L30.9 ECZEMA, UNSPECIFIED TYPE: ICD-10-CM

## 2024-06-05 DIAGNOSIS — F41.9 ANXIETY: ICD-10-CM

## 2024-06-05 DIAGNOSIS — O21.9 NAUSEA AND VOMITING DURING PREGNANCY: ICD-10-CM

## 2024-06-05 PROBLEM — O46.93 VAGINAL BLEEDING IN PREGNANCY, THIRD TRIMESTER: Status: ACTIVE | Noted: 2024-06-05

## 2024-06-05 PROCEDURE — 99214 OFFICE O/P EST MOD 30 MIN: CPT | Performed by: OBSTETRICS & GYNECOLOGY

## 2024-06-05 PROCEDURE — 0502F SUBSEQUENT PRENATAL CARE: CPT | Performed by: OBSTETRICS & GYNECOLOGY

## 2024-06-05 PROCEDURE — 59025 FETAL NON-STRESS TEST: CPT | Performed by: OBSTETRICS & GYNECOLOGY

## 2024-06-05 PROCEDURE — 99213 OFFICE O/P EST LOW 20 MIN: CPT

## 2024-06-05 NOTE — PROGRESS NOTES
23 y.o.  at 35w2d EGA Estimated Date of Delivery: 24 here for CARLOS:     Pt seen and examined. No concerns/complaints.  Denies VB, LOF, CTX. Endorses (+) FM. Denies fevers / chills / chest pain / shortness of breath.   Denies HA, changes with vision, RUQ pain, edema.   MWQ reviewed.     Objective:  /65   Pulse (!) 110   Temp 98 °F (36.7 °C) (Infrared)   Wt 92.5 kg (204 lb)   LMP 2023 (Exact Date)   SpO2 99%   BMI 30.11 kg/m²   Gen: AO, NAD  Abd: Soft, NT, gravid   SVE: 1-/-3  Ext: Mild LE edema    Assessment/Plan:   Diagnosis Orders   1. Prenatal care in third trimester        2. Nausea and vomiting during pregnancy        3. Moderate episode of recurrent major depressive disorder (HCC)        4. Anxiety        5. Eczema, unspecified type            Diagnosis Orders   1. Prenatal care in third trimester     GBS collected today   RTC In 1 week   Boy- Stew, desires circumcision   2. Nausea and vomiting during pregnancy        3. Moderate episode of recurrent major depressive disorder (HCC)        4. Anxiety        5. Eczema, unspecified type          - reassuring maternal / fetal status     Follow Up  Return OB precautions reviewed   No follow-ups on file.    Approximately 10 minutes spent in room counseling patient on condition and coordination of care with over 50% in direct face to face counseling.     Ashley Moreno, DO

## 2024-06-05 NOTE — TELEPHONE ENCOUNTER
Patient was seen in office today and had her GBS swab and had a cervical check.     Patient calling with concerns of vaginal bleeding after getting home. Patient reports passing a vaginal blood clot and then saturating a pad with bright red blood from 3pm to now.     Discussed with DR Moreno, advised to report to L&D. Called  Dr Fung to give report. L&D aware.

## 2024-06-05 NOTE — PROGRESS NOTES
Pt presents to triage with c/o vaginal bleeding that began after her OB exam today. Pt had bright red and dark red blood of a pad. Pt states that she had a blood clot the size of half of a monico. Pt states that she did have a cervical exam today in the office. Her exam per pt 1-2 and 70. Pt has +FM and no other vaginal leakage of fluid.

## 2024-06-06 NOTE — PROGRESS NOTES
D/c instructions given. Pt verbalized understanding and denied questions. Pt left OB unit @ 2026 in stable condition, ambulatory and undelivered. Not in active labor.

## 2024-06-06 NOTE — H&P
Obstetrics History and Physical     CC:   Chief Complaint   Patient presents with    Vaginal Bleeding       HPI: Alyssa Causey is a 23 y.o.  at 35w2d who presents with complaints of vaginal bleeding.      +FM.  Reports some cramping prior to her exam today as well as vaginal bleeding after having her cervix checked this afternoon.  After she went home she passed a dime sized clot.  She placed a pad on and had approx 1/5 full after 2 hours.  Bleeding has significantly slowed since she has arrived in triage.  Some irregular cramping.  Denies LOF.  Denies headache, vision changes, RUQ pain, increased LE edema.      Pregnancy has been complicated by anxiety/depression and nausea and vomiting,     Review of Systems: The following ROS was otherwise negative, except as noted in the HPI: constitutional, HEENT, respiratory, cardiovascular, gastrointestinal, genitourinary, skin, musculoskeletal, neurological, psych    OBGYN Provider : BRAXTON Fung DO     Obstetrical History:  OB History    Para Term  AB Living   1 0 0 0 0 0   SAB IAB Ectopic Molar Multiple Live Births   0 0 0 0 0 0      # Outcome Date GA Lbr Jeff/2nd Weight Sex Delivery Anes PTL Lv   1 Current              Past Medical History:   Past Medical History:   Diagnosis Date    ADHD     Anxiety     Depression      Medications:  Prior to Admission medications    Medication Sig Start Date End Date Taking? Authorizing Provider   acetaminophen (TYLENOL) 325 MG tablet Take 2 tablets by mouth every 6 hours as needed for Pain    ProviderYi MD   diphenhydrAMINE (BENADRYL) 50 MG capsule Take 1 capsule by mouth every 6 hours as needed for Itching    ProviderYi MD   ferrous sulfate (IRON 325) 325 (65 Fe) MG tablet Take 1 tablet by mouth daily (with breakfast) 24   Maria Victoria Fung DO   Prenatal MV-Min-Fe Fum-FA-DHA (PRENATAL 1 PO) Take by mouth    ProviderYi MD   doxylamine-pyridoxine 10-10 MG TBEC Take 1 tablet by  mouth 3 times daily as needed (nausea) Start with 1 tablet at night and in am, add afternoon dose as needed 11/29/23   Aby Sanderson,    buPROPion HCl ER, XL, 450 MG TB24 Take by mouth every morning 7/8/23   ProviderYi MD   LAMOTRIGINE PO Take 400 mg by mouth daily 7/8/23   ProviderYi MD      Allergies:  Patient has no known allergies.    Surgical History:  Past Surgical History:   Procedure Laterality Date    WISDOM TOOTH EXTRACTION       Family History:  Family History   Problem Relation Age of Onset    Diabetes type 2  Father     Cancer Sister     Ulcerative Colitis Brother      Social History:  Social History     Substance and Sexual Activity   Alcohol Use Not Currently     Social History     Substance and Sexual Activity   Drug Use Never     Social History     Tobacco Use   Smoking Status Never   Smokeless Tobacco Never     Physical Exam:  /74   Pulse 90   Temp 98.2 °F (36.8 °C) (Oral)   Resp 22   Ht 1.753 m (5' 9\")   Wt 92.5 kg (204 lb)   LMP 09/22/2023 (Exact Date)   SpO2 96%   BMI 30.13 kg/m²   General: Alert, well appearing, no acute distress  Head: Normocephalic, atraumatic  Lungs: Clear to auscultation bilaterally without rales, rhonchi, wheezing  CV: Regular rate and regular rhythm, normal S1, S2 without murmurs, rubs, clicks or gallops.  Abdomen: Gravid, soft, non-tender, non-distended.  No uterine tenderness to palpation.  Pelvic: Normal appearing genitalia without masses, tenderness or lesions.  Friable cervix noted with no active bleeding.    Extremities: No redness or tenderness, neg Ghanshyam's sign  Skin: Well perfused, normal coloration and turgor, no lesions or rashes visualized  Neuro: Alert, oriented, normal speech, no focal deficits, moves extremities appropriately  Psych: Appropriate, normal affect, appears stated age    Fetal Monitoring Interpretation:   Baseline: 150  Variability: moderate in degree  Accelerations: Present  Decelerations: Absent

## 2024-06-06 NOTE — PROGRESS NOTES
Fetal Monitoring Interpretation:     Indication: Vaginal bleeding in pregnancy    Baseline: 150  Variability: moderate in degree  Accelerations: Present  Decelerations: Absent                      [place]: 2 contractions present    Category 1    Reactive: Yes      Impression: Reactive, reassuring    Maria Victoria Fung DO

## 2024-06-09 LAB — GP B STREP SPEC QL CULT: NORMAL

## 2024-06-12 ENCOUNTER — ROUTINE PRENATAL (OUTPATIENT)
Dept: OBGYN CLINIC | Age: 24
End: 2024-06-12

## 2024-06-12 VITALS
WEIGHT: 202.2 LBS | TEMPERATURE: 97.8 F | SYSTOLIC BLOOD PRESSURE: 126 MMHG | BODY MASS INDEX: 29.86 KG/M2 | DIASTOLIC BLOOD PRESSURE: 70 MMHG | HEART RATE: 92 BPM

## 2024-06-12 DIAGNOSIS — F41.9 ANXIETY: ICD-10-CM

## 2024-06-12 DIAGNOSIS — L30.9 ECZEMA, UNSPECIFIED TYPE: ICD-10-CM

## 2024-06-12 DIAGNOSIS — N89.8 VAGINAL DISCHARGE: ICD-10-CM

## 2024-06-12 DIAGNOSIS — O21.9 NAUSEA AND VOMITING DURING PREGNANCY: ICD-10-CM

## 2024-06-12 DIAGNOSIS — Z34.03 PRENATAL CARE, FIRST PREGNANCY, THIRD TRIMESTER: Primary | ICD-10-CM

## 2024-06-12 DIAGNOSIS — F33.1 MODERATE EPISODE OF RECURRENT MAJOR DEPRESSIVE DISORDER (HCC): ICD-10-CM

## 2024-06-12 PROCEDURE — 0502F SUBSEQUENT PRENATAL CARE: CPT | Performed by: OBSTETRICS & GYNECOLOGY

## 2024-06-12 NOTE — PROGRESS NOTES
Return OB Office Visit    CC:   Chief Complaint   Patient presents with    Routine Prenatal Visit       HPI:  Patient seen and examined.      Denies VB.  Reports is having increased discharge, states she has to convince herself every morning that her water has not broken.  Having some back pain, no regular ctx. +FM - has noticed more movement in the morning and evening, is still able to meet kick counts through the day.  Denies headaches, vision changes, RUQ pain, increased LE edema.  Denies chest pain, shortness of breath, fever, chills, nausea, vomiting.      Review of Systems: The following ROS was otherwise negative, except as noted in the HPI: constitutional, HEENT, respiratory, cardiovascular, gastrointestinal, genitourinary, skin, musculoskeletal, neurological, psych    Objective:  /70   Pulse 92   Temp 97.8 °F (36.6 °C) (Infrared)   Wt 91.7 kg (202 lb 3.2 oz)   LMP 09/22/2023 (Exact Date)   BMI 29.86 kg/m²     Physical Exam  Vitals reviewed. Exam conducted with a chaperone present.   Constitutional:       General: She is not in acute distress.     Appearance: She is well-developed.   HENT:      Head: Normocephalic and atraumatic.   Eyes:      Conjunctiva/sclera: Conjunctivae normal.   Cardiovascular:      Rate and Rhythm: Normal rate.   Pulmonary:      Effort: Pulmonary effort is normal. No respiratory distress.   Abdominal:      General: There is no distension.      Palpations: Abdomen is soft.      Tenderness: There is no abdominal tenderness. There is no guarding or rebound.   Genitourinary:     General: Normal vulva.      Exam position: Lithotomy position.      Labia:         Right: No rash, tenderness or lesion.         Left: No rash, tenderness or lesion.       Vagina: Vaginal discharge present.      Comments: Negative ferning and nitrazine.  Cervix 1-2/70/-3  Musculoskeletal:         General: No swelling.   Skin:     General: Skin is warm and dry.   Neurological:      Mental Status: She is

## 2024-06-13 PROBLEM — Z34.03 PRENATAL CARE, FIRST PREGNANCY, THIRD TRIMESTER: Status: ACTIVE | Noted: 2024-03-22

## 2024-06-13 LAB
CANDIDA DNA VAG QL NAA+PROBE: NORMAL
G VAGINALIS DNA SPEC QL NAA+PROBE: NORMAL
T VAGINALIS DNA VAG QL NAA+PROBE: NORMAL

## 2024-06-18 ENCOUNTER — TELEPHONE (OUTPATIENT)
Dept: OBGYN CLINIC | Age: 24
End: 2024-06-18

## 2024-06-18 ENCOUNTER — HOSPITAL ENCOUNTER (OUTPATIENT)
Age: 24
Discharge: HOME | End: 2024-06-18
Attending: OBSTETRICS & GYNECOLOGY | Admitting: OBSTETRICS & GYNECOLOGY
Payer: COMMERCIAL

## 2024-06-18 VITALS
BODY MASS INDEX: 29.92 KG/M2 | WEIGHT: 202 LBS | OXYGEN SATURATION: 98 % | HEIGHT: 69 IN | DIASTOLIC BLOOD PRESSURE: 80 MMHG | RESPIRATION RATE: 16 BRPM | TEMPERATURE: 97.8 F | SYSTOLIC BLOOD PRESSURE: 121 MMHG | HEART RATE: 103 BPM

## 2024-06-18 PROCEDURE — 99212 OFFICE O/P EST SF 10 MIN: CPT

## 2024-06-18 PROCEDURE — 99213 OFFICE O/P EST LOW 20 MIN: CPT | Performed by: OBSTETRICS & GYNECOLOGY

## 2024-06-18 NOTE — TELEPHONE ENCOUNTER
Pt called office again and states she has had about 5 contractions in the last hour. She reports feeling \"lots of pressure\". No LOF, + FM. States contractions are stopping from normal activity but more pressure than pain.Pt advised to report to Triage. Called L&D to make aware.  Routing to Dr Roca.

## 2024-06-18 NOTE — PROGRESS NOTES
Pt arrived to triage, placed in gown and placed on EFM, VS are WNL. Pt c/o contractions with pain 5 out of 10 and consistently occurring over the course of the day. Pt denies LOF, bleeding, or other concerns. Pt confirms kick count of 10x/hr. Pt admits to sexual intercourse last night. Will continue to monitor and notify OB.

## 2024-06-18 NOTE — TELEPHONE ENCOUNTER
37w1d. Pt calling with concern for contractions/ [place] pimentel. She says she has been having period like cramps that last night lasted for about 2 hours. She says it does ease up after a bit. Denies LOF, +FM Advised patient to monitor for contractions. If having more than 6 contractions in one hour, lasting longer than a minute, getting stronger to the point she has to stop what she is doing then report to labor and delivery for evaluation. Also advised to report to labor and delivery if she has decreased or no fetal movement or if she has any vaginal bleeding, leakage or gush of vaginal fluid.

## 2024-06-18 NOTE — PROGRESS NOTES
Call placed to Dr. Roca to inform her of pt's presence in T3, stated complaints and SVE. Order received to allow pt PO hydration, to come off monitors to ambulate in FBC and to repeat SVE in 1-2 hours.

## 2024-06-19 NOTE — H&P
Obstetrics Triage History and Physical    CC:   Chief Complaint   Patient presents with    Contractions       HPI: Alyssa Causey is a 23 y.o.  at 37w1d who presents with complaints of contractions.  Says they started around 1am, were more regular when she got here. Now have spaced out some but they feel more uncomfortable when she does have them. No VB, LOF. +FM.    Pregnancy has been complicated by anxiety/depression and nausea and vomiting.    Review of Systems: The following ROS was otherwise negative, except as noted in the HPI: constitutional, HEENT, respiratory, cardiovascular, gastrointestinal, genitourinary, skin, musculoskeletal, neurological, psych.     OBGYN Provider : Maria E    Obstetrical History:  OB History    Para Term  AB Living   1 0 0 0 0 0   SAB IAB Ectopic Molar Multiple Live Births   0 0 0 0 0 0      # Outcome Date GA Lbr Jeff/2nd Weight Sex Delivery Anes PTL Lv   1 Current                Past Medical History:   Past Medical History:   Diagnosis Date    ADHD     Anxiety     Depression        Medications:  No current facility-administered medications on file prior to encounter.     Current Outpatient Medications on File Prior to Encounter   Medication Sig Dispense Refill    acetaminophen (TYLENOL) 325 MG tablet Take 2 tablets by mouth every 6 hours as needed for Pain      diphenhydrAMINE (BENADRYL) 50 MG capsule Take 1 capsule by mouth every 6 hours as needed for Itching      ferrous sulfate (IRON 325) 325 (65 Fe) MG tablet Take 1 tablet by mouth daily (with breakfast) 90 tablet 1    Prenatal MV-Min-Fe Fum-FA-DHA (PRENATAL 1 PO) Take by mouth      doxylamine-pyridoxine 10-10 MG TBEC Take 1 tablet by mouth 3 times daily as needed (nausea) Start with 1 tablet at night and in am, add afternoon dose as needed (Patient not taking: Reported on 2024) 60 tablet 2    buPROPion HCl ER, XL, 450 MG TB24 Take by mouth every morning (Patient not taking: Reported on 2024)

## 2024-06-19 NOTE — PROGRESS NOTES
Pt verbalized understanding of verbal and written discharge instructions and denies having questions at this time. Pt left OB unit at 2054 ambulatory, undelivered, and in stable condition, accompanied by FOB. Patient is not in active labor.

## 2024-06-19 NOTE — TELEPHONE ENCOUNTER
I think you need to sign notes.    Patient seen in triage and discharged home, see Epic notes. Thanks.

## 2024-06-19 NOTE — DISCHARGE INSTRUCTIONS
continue to be pregnant.   Your water breaks, but labor does not start.      When should you call for help?   Call 911 anytime you think you may need emergency care. For example, call if:   You passed out (lost consciousness).   You have severe vaginal bleeding.   You have severe pain in your belly or pelvis.   You have had fluid gushing or leaking from your vagina and you know or think the umbilical cord is bulging into your vagina. If this happens, immediately get down on your knees so your rear end (buttocks) is higher than your head. This will decrease the pressure on the cord until help arrives.    Call your doctor now or seek immediate medical care if:   You have signs of preeclampsia, such as:   Sudden swelling of your face, hands, or feet.   New vision problems (such as dimness or blurring).   A severe headache.  You have change in amount or type of vaginal discharge  You have belly pain or cramping; low back or pelvic pressure; or pain up by your ribs that does not go away.  You have abdominal cramps that may be accompanied by diarrhea.  You have a fever- temperature of 100.6 or more.  You have had regular contractions (with or without pain) for an hour. This means that you have 8 or more within 1 hour or 4 or more in 20 minutes after you change your position and drink fluids.   You have a sudden release of fluid from the vagina.   You notice that your baby has stopped moving or is moving much less than normal- less than 5 times in 1 hour.  You have burning with urination, urgency or frequency

## 2024-06-20 ENCOUNTER — ROUTINE PRENATAL (OUTPATIENT)
Dept: OBGYN CLINIC | Age: 24
End: 2024-06-20

## 2024-06-20 VITALS
BODY MASS INDEX: 30.48 KG/M2 | DIASTOLIC BLOOD PRESSURE: 73 MMHG | SYSTOLIC BLOOD PRESSURE: 128 MMHG | HEART RATE: 88 BPM | WEIGHT: 206.4 LBS

## 2024-06-20 DIAGNOSIS — F41.9 ANXIETY: ICD-10-CM

## 2024-06-20 DIAGNOSIS — F33.1 MODERATE EPISODE OF RECURRENT MAJOR DEPRESSIVE DISORDER (HCC): ICD-10-CM

## 2024-06-20 DIAGNOSIS — O21.9 NAUSEA AND VOMITING DURING PREGNANCY: ICD-10-CM

## 2024-06-20 DIAGNOSIS — Z34.93 PRENATAL CARE IN THIRD TRIMESTER: Primary | ICD-10-CM

## 2024-06-20 PROCEDURE — 0502F SUBSEQUENT PRENATAL CARE: CPT | Performed by: OBSTETRICS & GYNECOLOGY

## 2024-06-20 NOTE — PROGRESS NOTES
Return OB Office Visit    CC:   Chief Complaint   Patient presents with    Routine Prenatal Visit       HPI:  Pt seen and examined. No concerns/complaints. Denies VB, LOF, ctx. +FM. Was seen in triage earlier this week for contractions, SVE unchanged after recheck. Says contractions have also stopped as of yesterday.     Maternal wellness questionnaire reviewed - no concerns today.     Objective:  /73   Pulse 88   Wt 93.6 kg (206 lb 6.4 oz)   LMP 2023 (Exact Date)   BMI 30.48 kg/m²   Gen: AO, NAD  Abd: Soft, NT  FHT: 155  FH: 37cm, vertex by [place]    Assessment/Plan:  23 y.o.  at 37w3d (Estimated Date of Delivery: 24) presents for CARLOS appointment:      Diagnosis Orders   1. Prenatal care in third trimester        2. Moderate episode of recurrent major depressive disorder (HCC)        3. Nausea and vomiting during pregnancy        4. Anxiety          Doing well today, routine care  - FWB reassuring today  - GBS negative  - return/labor precautions discussed    Dispo: RTC in 1 week  Macie Roca MD

## 2024-06-25 ENCOUNTER — ROUTINE PRENATAL (OUTPATIENT)
Dept: OBGYN CLINIC | Age: 24
End: 2024-06-25

## 2024-06-25 VITALS
TEMPERATURE: 98 F | BODY MASS INDEX: 30.86 KG/M2 | DIASTOLIC BLOOD PRESSURE: 70 MMHG | HEART RATE: 113 BPM | SYSTOLIC BLOOD PRESSURE: 128 MMHG | WEIGHT: 209 LBS

## 2024-06-25 DIAGNOSIS — O21.9 NAUSEA AND VOMITING DURING PREGNANCY: ICD-10-CM

## 2024-06-25 DIAGNOSIS — O47.9 BRAXTON HICKS CONTRACTIONS: ICD-10-CM

## 2024-06-25 DIAGNOSIS — Z34.93 PRENATAL CARE IN THIRD TRIMESTER: Primary | ICD-10-CM

## 2024-06-25 DIAGNOSIS — F41.9 ANXIETY: ICD-10-CM

## 2024-06-25 PROCEDURE — 0502F SUBSEQUENT PRENATAL CARE: CPT | Performed by: OBSTETRICS & GYNECOLOGY
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
## 2024-06-26 ENCOUNTER — HOSPITAL ENCOUNTER (INPATIENT)
Age: 24
LOS: 2 days | Discharge: HOME | End: 2024-06-28
Attending: OBSTETRICS & GYNECOLOGY | Admitting: OBSTETRICS & GYNECOLOGY
Payer: COMMERCIAL

## 2024-06-26 PROBLEM — Z37.9 NORMAL LABOR: Status: ACTIVE | Noted: 2024-06-26

## 2024-06-26 LAB
ABO + RH BLD: NORMAL
AMPHETAMINES UR QL SCN>1000 NG/ML: NORMAL
BARBITURATES UR QL SCN>200 NG/ML: NORMAL
BASOPHILS # BLD: 0 K/UL (ref 0–0.2)
BASOPHILS # BLD: 0.1 K/UL (ref 0–0.2)
BASOPHILS NFR BLD: 0.1 %
BASOPHILS NFR BLD: 0.8 %
BENZODIAZ UR QL SCN>200 NG/ML: NORMAL
BLD GP AB SCN SERPL QL: NORMAL
BUPRENORPHINE+NOR UR QL SCN: NORMAL
CANNABINOIDS UR QL SCN>50 NG/ML: NORMAL
COCAINE UR QL SCN: NORMAL
DEPRECATED RDW RBC AUTO: 15.9 % (ref 12.4–15.4)
DEPRECATED RDW RBC AUTO: 16.1 % (ref 12.4–15.4)
DRUG SCREEN COMMENT UR-IMP: NORMAL
EOSINOPHIL # BLD: 0.1 K/UL (ref 0–0.6)
EOSINOPHIL # BLD: 0.1 K/UL (ref 0–0.6)
EOSINOPHIL NFR BLD: 0.8 %
EOSINOPHIL NFR BLD: 0.8 %
FENTANYL SCREEN, URINE: NORMAL
HCT VFR BLD AUTO: 29.3 % (ref 36–48)
HCT VFR BLD AUTO: 32.5 % (ref 36–48)
HGB BLD-MCNC: 11.4 G/DL (ref 12–16)
HGB BLD-MCNC: 9.8 G/DL (ref 12–16)
LYMPHOCYTES # BLD: 0.8 K/UL (ref 1–5.1)
LYMPHOCYTES # BLD: 1.7 K/UL (ref 1–5.1)
LYMPHOCYTES NFR BLD: 17.9 %
LYMPHOCYTES NFR BLD: 6.3 %
MCH RBC QN AUTO: 27.6 PG (ref 26–34)
MCH RBC QN AUTO: 28.5 PG (ref 26–34)
MCHC RBC AUTO-ENTMCNC: 33.4 G/DL (ref 31–36)
MCHC RBC AUTO-ENTMCNC: 35 G/DL (ref 31–36)
MCV RBC AUTO: 81.4 FL (ref 80–100)
MCV RBC AUTO: 82.5 FL (ref 80–100)
METHADONE UR QL SCN>300 NG/ML: NORMAL
MONOCYTES # BLD: 0.9 K/UL (ref 0–1.3)
MONOCYTES # BLD: 1.2 K/UL (ref 0–1.3)
MONOCYTES NFR BLD: 8.8 %
MONOCYTES NFR BLD: 9 %
NEUTROPHILS # BLD: 11.1 K/UL (ref 1.7–7.7)
NEUTROPHILS # BLD: 6.9 K/UL (ref 1.7–7.7)
NEUTROPHILS NFR BLD: 71.5 %
NEUTROPHILS NFR BLD: 84 %
OPIATES UR QL SCN>300 NG/ML: NORMAL
OXYCODONE UR QL SCN: NORMAL
PCP UR QL SCN>25 NG/ML: NORMAL
PH UR STRIP: 6.5 [PH]
PLATELET # BLD AUTO: 198 K/UL (ref 135–450)
PLATELET # BLD AUTO: 206 K/UL (ref 135–450)
PMV BLD AUTO: 8.8 FL (ref 5–10.5)
PMV BLD AUTO: 8.9 FL (ref 5–10.5)
RBC # BLD AUTO: 3.56 M/UL (ref 4–5.2)
RBC # BLD AUTO: 3.99 M/UL (ref 4–5.2)
REAGIN+T PALLIDUM IGG+IGM SERPL-IMP: NORMAL
WBC # BLD AUTO: 13.2 K/UL (ref 4–11)
WBC # BLD AUTO: 9.7 K/UL (ref 4–11)

## 2024-06-26 PROCEDURE — 59400 OBSTETRICAL CARE: CPT | Performed by: OBSTETRICS & GYNECOLOGY

## 2024-06-26 PROCEDURE — 86900 BLOOD TYPING SEROLOGIC ABO: CPT

## 2024-06-26 PROCEDURE — 6370000000 HC RX 637 (ALT 250 FOR IP): Performed by: OBSTETRICS & GYNECOLOGY

## 2024-06-26 PROCEDURE — 36415 COLL VENOUS BLD VENIPUNCTURE: CPT

## 2024-06-26 PROCEDURE — 86780 TREPONEMA PALLIDUM: CPT

## 2024-06-26 PROCEDURE — 7200000001 HC VAGINAL DELIVERY

## 2024-06-26 PROCEDURE — 86850 RBC ANTIBODY SCREEN: CPT

## 2024-06-26 PROCEDURE — 0KQM0ZZ REPAIR PERINEUM MUSCLE, OPEN APPROACH: ICD-10-PCS | Performed by: OBSTETRICS & GYNECOLOGY

## 2024-06-26 PROCEDURE — 85025 COMPLETE CBC W/AUTO DIFF WBC: CPT

## 2024-06-26 PROCEDURE — 2580000003 HC RX 258: Performed by: OBSTETRICS & GYNECOLOGY

## 2024-06-26 PROCEDURE — 1220000000 HC SEMI PRIVATE OB R&B

## 2024-06-26 PROCEDURE — 80307 DRUG TEST PRSMV CHEM ANLYZR: CPT

## 2024-06-26 PROCEDURE — 86901 BLOOD TYPING SEROLOGIC RH(D): CPT

## 2024-06-26 RX ORDER — SODIUM CHLORIDE 0.9 % (FLUSH) 0.9 %
5-40 SYRINGE (ML) INJECTION PRN
Status: DISCONTINUED | OUTPATIENT
Start: 2024-06-26 | End: 2024-06-26

## 2024-06-26 RX ORDER — SODIUM CHLORIDE, SODIUM LACTATE, POTASSIUM CHLORIDE, AND CALCIUM CHLORIDE .6; .31; .03; .02 G/100ML; G/100ML; G/100ML; G/100ML
500 INJECTION, SOLUTION INTRAVENOUS PRN
Status: DISCONTINUED | OUTPATIENT
Start: 2024-06-26 | End: 2024-06-26

## 2024-06-26 RX ORDER — ACETAMINOPHEN 325 MG/1
650 TABLET ORAL EVERY 4 HOURS PRN
Status: DISCONTINUED | OUTPATIENT
Start: 2024-06-26 | End: 2024-06-26

## 2024-06-26 RX ORDER — BENZOCAINE/MENTHOL 6 MG-10 MG
LOZENGE MUCOUS MEMBRANE
Status: DISCONTINUED | OUTPATIENT
Start: 2024-06-26 | End: 2024-06-28 | Stop reason: HOSPADM

## 2024-06-26 RX ORDER — SODIUM CHLORIDE 9 MG/ML
25 INJECTION, SOLUTION INTRAVENOUS PRN
Status: DISCONTINUED | OUTPATIENT
Start: 2024-06-26 | End: 2024-06-26

## 2024-06-26 RX ORDER — SODIUM CHLORIDE 0.9 % (FLUSH) 0.9 %
5-40 SYRINGE (ML) INJECTION EVERY 12 HOURS SCHEDULED
Status: DISCONTINUED | OUTPATIENT
Start: 2024-06-26 | End: 2024-06-26

## 2024-06-26 RX ORDER — DOCUSATE SODIUM 100 MG/1
100 CAPSULE, LIQUID FILLED ORAL 2 TIMES DAILY
Status: DISCONTINUED | OUTPATIENT
Start: 2024-06-26 | End: 2024-06-26

## 2024-06-26 RX ORDER — SODIUM CHLORIDE, SODIUM LACTATE, POTASSIUM CHLORIDE, CALCIUM CHLORIDE 600; 310; 30; 20 MG/100ML; MG/100ML; MG/100ML; MG/100ML
INJECTION, SOLUTION INTRAVENOUS CONTINUOUS
Status: DISCONTINUED | OUTPATIENT
Start: 2024-06-26 | End: 2024-06-26 | Stop reason: SDUPTHER

## 2024-06-26 RX ORDER — ONDANSETRON 2 MG/ML
4 INJECTION INTRAMUSCULAR; INTRAVENOUS EVERY 6 HOURS PRN
Status: DISCONTINUED | OUTPATIENT
Start: 2024-06-26 | End: 2024-06-26

## 2024-06-26 RX ORDER — OXYCODONE HYDROCHLORIDE 5 MG/1
5 TABLET ORAL EVERY 6 HOURS PRN
Status: DISCONTINUED | OUTPATIENT
Start: 2024-06-26 | End: 2024-06-28 | Stop reason: HOSPADM

## 2024-06-26 RX ORDER — NALBUPHINE HYDROCHLORIDE 10 MG/ML
10 INJECTION, SOLUTION INTRAMUSCULAR; INTRAVENOUS; SUBCUTANEOUS
Status: DISCONTINUED | OUTPATIENT
Start: 2024-06-26 | End: 2024-06-26

## 2024-06-26 RX ORDER — LIDOCAINE HYDROCHLORIDE 10 MG/ML
INJECTION, SOLUTION INFILTRATION; PERINEURAL
Status: DISCONTINUED
Start: 2024-06-26 | End: 2024-06-26

## 2024-06-26 RX ORDER — SODIUM CHLORIDE 9 MG/ML
INJECTION, SOLUTION INTRAVENOUS PRN
Status: DISCONTINUED | OUTPATIENT
Start: 2024-06-26 | End: 2024-06-28 | Stop reason: HOSPADM

## 2024-06-26 RX ORDER — SODIUM CHLORIDE 0.9 % (FLUSH) 0.9 %
5-40 SYRINGE (ML) INJECTION PRN
Status: DISCONTINUED | OUTPATIENT
Start: 2024-06-26 | End: 2024-06-28 | Stop reason: HOSPADM

## 2024-06-26 RX ORDER — LANOLIN 100 %
OINTMENT (GRAM) TOPICAL PRN
Status: DISCONTINUED | OUTPATIENT
Start: 2024-06-26 | End: 2024-06-28 | Stop reason: HOSPADM

## 2024-06-26 RX ORDER — FERROUS SULFATE 325(65) MG
325 TABLET ORAL
Status: DISCONTINUED | OUTPATIENT
Start: 2024-06-26 | End: 2024-06-28 | Stop reason: HOSPADM

## 2024-06-26 RX ORDER — IBUPROFEN 800 MG/1
800 TABLET ORAL EVERY 8 HOURS PRN
Status: DISCONTINUED | OUTPATIENT
Start: 2024-06-26 | End: 2024-06-28 | Stop reason: HOSPADM

## 2024-06-26 RX ORDER — SIMETHICONE 80 MG
80 TABLET,CHEWABLE ORAL EVERY 6 HOURS PRN
Status: DISCONTINUED | OUTPATIENT
Start: 2024-06-26 | End: 2024-06-28 | Stop reason: HOSPADM

## 2024-06-26 RX ORDER — DOCUSATE SODIUM 100 MG/1
100 CAPSULE, LIQUID FILLED ORAL 2 TIMES DAILY
Status: DISCONTINUED | OUTPATIENT
Start: 2024-06-26 | End: 2024-06-28 | Stop reason: HOSPADM

## 2024-06-26 RX ORDER — FAMOTIDINE 20 MG/1
20 TABLET, FILM COATED ORAL 2 TIMES DAILY PRN
Status: DISCONTINUED | OUTPATIENT
Start: 2024-06-26 | End: 2024-06-28 | Stop reason: HOSPADM

## 2024-06-26 RX ORDER — ACETAMINOPHEN 500 MG
1000 TABLET ORAL EVERY 8 HOURS PRN
Status: DISCONTINUED | OUTPATIENT
Start: 2024-06-26 | End: 2024-06-28 | Stop reason: HOSPADM

## 2024-06-26 RX ORDER — ONDANSETRON 4 MG/1
4 TABLET, ORALLY DISINTEGRATING ORAL EVERY 6 HOURS PRN
Status: DISCONTINUED | OUTPATIENT
Start: 2024-06-26 | End: 2024-06-26

## 2024-06-26 RX ORDER — KETOROLAC TROMETHAMINE 30 MG/ML
30 INJECTION, SOLUTION INTRAMUSCULAR; INTRAVENOUS EVERY 6 HOURS PRN
Status: DISCONTINUED | OUTPATIENT
Start: 2024-06-26 | End: 2024-06-28 | Stop reason: HOSPADM

## 2024-06-26 RX ORDER — ONDANSETRON 2 MG/ML
4 INJECTION INTRAMUSCULAR; INTRAVENOUS EVERY 6 HOURS PRN
Status: DISCONTINUED | OUTPATIENT
Start: 2024-06-26 | End: 2024-06-28 | Stop reason: HOSPADM

## 2024-06-26 RX ORDER — SODIUM CHLORIDE, SODIUM LACTATE, POTASSIUM CHLORIDE, CALCIUM CHLORIDE 600; 310; 30; 20 MG/100ML; MG/100ML; MG/100ML; MG/100ML
INJECTION, SOLUTION INTRAVENOUS CONTINUOUS
Status: DISCONTINUED | OUTPATIENT
Start: 2024-06-26 | End: 2024-06-28 | Stop reason: HOSPADM

## 2024-06-26 RX ORDER — SODIUM CHLORIDE 0.9 % (FLUSH) 0.9 %
5-40 SYRINGE (ML) INJECTION EVERY 12 HOURS SCHEDULED
Status: DISCONTINUED | OUTPATIENT
Start: 2024-06-26 | End: 2024-06-28 | Stop reason: HOSPADM

## 2024-06-26 RX ORDER — SODIUM CHLORIDE, SODIUM LACTATE, POTASSIUM CHLORIDE, CALCIUM CHLORIDE 600; 310; 30; 20 MG/100ML; MG/100ML; MG/100ML; MG/100ML
INJECTION, SOLUTION INTRAVENOUS CONTINUOUS
Status: DISCONTINUED | OUTPATIENT
Start: 2024-06-26 | End: 2024-06-26

## 2024-06-26 RX ORDER — ONDANSETRON 4 MG/1
4 TABLET, ORALLY DISINTEGRATING ORAL EVERY 6 HOURS PRN
Status: DISCONTINUED | OUTPATIENT
Start: 2024-06-26 | End: 2024-06-28 | Stop reason: HOSPADM

## 2024-06-26 RX ADMIN — DOCUSATE SODIUM 100 MG: 100 CAPSULE, LIQUID FILLED ORAL at 09:39

## 2024-06-26 RX ADMIN — IBUPROFEN 800 MG: 800 TABLET, FILM COATED ORAL at 07:49

## 2024-06-26 RX ADMIN — DOCUSATE SODIUM 100 MG: 100 CAPSULE, LIQUID FILLED ORAL at 20:46

## 2024-06-26 RX ADMIN — IBUPROFEN 800 MG: 800 TABLET, FILM COATED ORAL at 16:53

## 2024-06-26 RX ADMIN — SODIUM CHLORIDE, SODIUM LACTATE, POTASSIUM CHLORIDE, AND CALCIUM CHLORIDE: .6; .31; .03; .02 INJECTION, SOLUTION INTRAVENOUS at 04:10

## 2024-06-26 RX ADMIN — ACETAMINOPHEN 1000 MG: 500 TABLET ORAL at 18:15

## 2024-06-26 RX ADMIN — FERROUS SULFATE TAB 325 MG (65 MG ELEMENTAL FE) 325 MG: 325 (65 FE) TAB at 18:16

## 2024-06-26 RX ADMIN — ACETAMINOPHEN 1000 MG: 500 TABLET ORAL at 09:39

## 2024-06-26 RX ADMIN — OXYCODONE 5 MG: 5 TABLET ORAL at 14:27

## 2024-06-26 ASSESSMENT — PAIN SCALES - GENERAL
PAINLEVEL_OUTOF10: 6
PAINLEVEL_OUTOF10: 7
PAINLEVEL_OUTOF10: 8
PAINLEVEL_OUTOF10: 4

## 2024-06-26 NOTE — H&P
Department of Obstetrics and Gynecology  Attending Obstetrics History and Physical        CHIEF COMPLAINT:  contractions, leakage of amniotic fluid    HISTORY OF PRESENT ILLNESS:      The patient is a 23 y.o.  1 parity 0 at 38 weeks 2 days gestation with EDC 24 presented to L&D triage for evaluation secondary to suspected rupture of membranes.  Leakage of fluid occurred at 01:30.  Upon arrival gross rupture of membranes was noted and patient was admitted.  Patient quickly progressed to 7 cm and ultimately complete dilatation.  Denied vaginal bleeding and decreased fetal movement upon arrival.  Pregnancy has been complicated by depression, anxiety, history of thyroid disease, and nausea/vomiting.  Patient presents with a chief complaint as above and is being admitted for active phase labor/delivery    DATES:    Last Menstrual Period:  23  Estimated Due Date:  24    PRENATAL CARE:    Provider:  BRAXTON Sanderson D.O., [facility]    Blood Type/Rh:  A positive  Antibody Screen:  negative  Rubella:  immune  RPR:  non-reactive  Hepatitis B Surface Antigen: non-reactive  HIV:  non-reactive  Gonorrhea:  negative  Chlamydia:  negative  MSAFP/Multiple Markers:  Date:  ; Results:    U/S Structural Survery:  see report  1 hour Glucose Tolerance Test:  99  Group B Strep:  negative      PAST OB HISTORY        Depression:  Yes       Post-partum depression:  n/a      Diabetes:  No      Gestational Diabetes:  No      Thyroid Disease:  Yes      Chronic HTN:  No      Gestation HTN:  No      Pre-eclampsia:  No      Seizure disorder:  No      Asthma:  No      Clotting disorder:  No      :  No      Tubal ligation:  No      D & C:  No      Cerclage:  No      LEEP:  No      Myomectomy:  No    OB History    Para Term  AB Living   1 0 0 0 0 0   SAB IAB Ectopic Molar Multiple Live Births   0 0 0 0 0 0      # Outcome Date GA Lbr Jeff/2nd Weight Sex Delivery Anes PTL Lv   1 Current              Past

## 2024-06-26 NOTE — LACTATION NOTE
This note was copied from a baby's chart.  Attempted lactation consult at this time. MOB resting and infant asleep in crib, did not disturb. Spoke with FOB at bedside, provided booklet and encouraged to call when MOB awakes for next feeding.

## 2024-06-26 NOTE — PROGRESS NOTES
Dr. Traylor notified that pt is 7cm and making change quickly.  Will come to hospital for delivery.

## 2024-06-26 NOTE — PROGRESS NOTES
Pt presents to triage after potential SROM at home around 0130. States she was up and went to the bathroom, and after voiding she had a gush of fluid, and 2 more that soaked through 3 pads. +FM, denies VB, placed on EFM at this time.

## 2024-06-26 NOTE — PROGRESS NOTES
Patient desires up to BR.  Patient assisted to sitting on side of bed and allowed to \"dangle.\"  Patient denies dizziness and lightheadedness.  Patient assisted up to x 2 assistants and ambulated without difficulty to BR.  Patient voided 500ml blood tinged urine.  Pericare taught and demonstration returned.  Patient informed on postpartum pericare and instructions given of use of tucks pads and dermaplast spray.  Linens and gown changed.  Patient assisted back to bed and call light within reach.  Patient instructed to call for assistance with next void.  Patient verbalized understanding.

## 2024-06-26 NOTE — L&D DELIVERY SUMMARY NOTE
Department of Obstetrics and Gynecology  Spontaneous Vaginal Delivery Note      Pre-operative Diagnosis:  Term pregnancy, Spontaneous labor, Single fetus, and Pregnancy complicated by: anxiety, depression, nausea, vomiting    Post-operative Diagnosis:  Living  infant(s) and Male    Information for the patient's :  Richmond Causey [[id number]]                  Infant Wt:   Information for the patient's :  Richmond Causey [[id number]]         APGARS:     Information for the patient's :  Richmond Causey [[id number]]         Anesthesia:  none, 20 cc local xylocaine infiltrate    Application and Delivery:    24 y/o  female at 38 weeks 2 days gestation with EDC 24 presented to L&D triage for evaluation secondary to suspected rupture of membranes. Leakage of fluid occurred at 01:30. Upon arrival gross rupture of membranes was noted and patient was admitted. Patient quickly progressed to 7 cm and ultimately complete dilatation. Patient pushed effectively for less than 30 minutes and delivered a viable male  over an intact perineum from an VALENTÍN presentation.  A loose nuchal cord x 1 was reduced on the perineum.  Shoulders and body delivered immediately after the head.   was placed on maternal abdomen for suctioning and drying.  After appropriate delay, cord was clamped and cut.  A segment of cord was collected but later discarded due to fetal wellbeing.  Cord blood was collected.  Placenta delivered complete intact with 3 vessel cord.  The lower uterine segment was evaluated and cleared of all clots and debris.  Fundus was found to be firm and hemostasis assured.  No cervical lacerations were noted.  Bilateral lateral vaginal lacerations were reapproximated for hemastasis.  A right labial tear was reapproximated for hemostasis.  A second degree perineal laceration was repaired in the usual fashion.  Rectum was found to be intact.  Hemostasis was assured.   Sponge, lap and needle counts were correct x 2.  Patient tolerated the procedure well and was left to recover in L&D room in stable condition.         Delivery Summary:  Labor & Delivery Summary  Dilation Complete Date: 06/26/24  Dilation Complete Time: 0438    Specimen:  Placenta sent to pathology for hold specimen     Intake/Output:   QBL: 400 cc    Condition:  infant stable and mother stable    Blood Type and Rh: A POS        Rubella Immunity Status:   Immune           Infant Feeding:    breast    Attending Attestation: I performed the procedure.

## 2024-06-26 NOTE — LACTATION NOTE
Urine pH less than 5.0 or greater than 8.0 may indicate sample adulteration.  Another sample should be collected if clinically  indicated.       pH, UA   Date Value Ref Range Status   2023 8.0  Final     Comment:     Urine pH less than 5.0 or greater than 8.0 may indicate sample adulteration.  Another sample should be collected if clinically  indicated.       Drug Screen Comment:   Date Value Ref Range Status   2024 see below  Final     Comment:     This method is a screening test to detect only these drug  classes as part of a medical workup.  Confirmatory testing  by another method should be ordered if clinically indicated.          Other significant maternal history: High BMI , Anxiety , Depression , and ADHD     Delivery Information:  Born on 2025 at 5:00 AM  Delivery method: Vaginal, Spontaneous [250]  Additional Information:  Forceps attempted? No [0]  Vacuum extractor attempted? No [0]    Infant Assessment:    DOL:5 hours   Feeding: Breastfeeding     Nipple Shield in Use:  No    I&O adequacy:  Urine output: is not established  Stool output: is not established  Percent weight change from birthweight: 0%    Oral Assessment   Palate: intact   Frenulum: appears to move well however did not see full lift of the tongue.          TABBY SCORE: 6 or 7    Scoring of TABBY tool  A score of:   8 indicates normal tongue function;   6 or 7 are considered as borderline: suggest a                              'wait and see' approach with support for  breastfeeding positioning & attachment;   5 or below suggests that there is impairment of  tongue function.    Birth Factors/Diagnosis that could create risk for breastfeeding:     Glucose: No    Intervention During Consultation    Interventions Performed:   [place] Breastfeeding Booklet Provided , Assisted with breastfeeding , Hand expression, Education , and Skin to skin     Latch & Positioning: Assisted MOB to position infant to the right breast in a  football hold. Reviewed position and hold. Educated on the importance of maternal comfort and infant alignment to the breast for feeds. Educated on hand expression prior to feeds and large milk drops easily expressed. Instructed on nipple to nose, compression on the breast and bringing infant up and onto the breast for latch. Multiple attempts made however infant disinterested in feeding at this time. Infant placed skin to skin while LC provided additional education. After education again positioned to the right breast in a football hold. With above techniques at 1055a infant latched well with short to moderate coordinated suck bursts, long jaw motions and audible swallows. Infant remained actively feeding at time of LC exit from room. Encouraged to call for continued assist.     Manual Expression:  Expresses easily, Drops obtained, MOB demonstrates well , and MOB states she understands     Bedside Breast Pump:   N/A    Breast Shield Size:   N/A    Amount of milk expressed:   N/A    Pump Arrangements:  Faxed to: Mommy [employer] and Breast pump requested: Trisha ADKINS    Education:  Latch & positioning , Feeding frequency & feeding cues , Exclusive breastfeeding , Breastfeeding Booklet reviewed , No artificial nipples , Risks of formula feeding , Expected intake and output , Feeding and diaper log , Skin to skin , Engorgement prevention & management , Milk storage guidelines , Hand expression , [facility] , and [facility]     Action/Plan:  Breastfeed on cue and at least 8 times/24 hours, unlimited timing. May not nurse this often in the first 24 hours. Wake baby and offer breastfeeding if it has been 3 hours since the beginning of last feeding. Place infant skin to skin if infant will not breastfeed at 3 hours.   Hand express prior to latch to thao nipple and entice infant to the breast.   It is important to use gentle stimulation during feeding to promote active eating.

## 2024-06-26 NOTE — PROGRESS NOTES
Notified Dr. Traylor of pt's arrival and assessment, SVE 3-4/80/-2, confirmed SROM at 0130 for clear fluid with particulate that looks like old blood. Ctx currently Q2.5-3, Cat 1 FHTs. Orders received for admission, would like RN caring for pt to recheck SVE in 2-3 hrs, if no change then call with update and anticipate initiating pitocin for augmentation.

## 2024-06-26 NOTE — LACTATION NOTE
This note was copied from a baby's chart.  LACTATION CONSULTATION      Follow-up Consult: Reason for Follow-up: assist with latching , assess needs , and provide education     Name: Richmond Causey       MRN: [id number]               YOB: 2025   Time of Birth: 5:00 AM   Gestational age: Gestational Age: 38w2d   Birth Weight: Birth Weight: 3.654 kg (8 lb 0.9 oz) Most Recent Weight: Weight: 3.654 kg (8 lb 0.9 oz) (Filed from Delivery Summary)   Weight Change from Birth: 0%            Maternal Assessment:      Maternal Data:   Information for the patient's mother:  Alyssa Causey [[id number]]   24 y.o.    /Para:   Information for the patient's mother:  Alyssa Causey [[id number]]        Information for the patient's mother:  Alyssa Causey [[id number]]   38w2d          Breast Assessment  Right Breast: Breasts not assessed this encounter   Left Breast: WDL and Large  Left Nipple: Everts well   Left Areola: WDL   Left Nipple Comfort: comfortable   Left Nipple Integrity: Intact       Infant Assessment:      DOL:8 hours       Feeding: Breastfeeding      Intervention during consultation:     Interventions Performed:   Assisted with breastfeeding     Latch & Positioning: Assisted MOB to position infant to the left breast in a football hold. Reviewed position and hold. Reviewed hand expression, nipple to nose, compression on the breast and bringing infant up and onto the breast for latch. Rolled pillowcase placed under breast for additional support. Infant latched well with above techniques. Infant with moderate coordinated suck bursts, long jaw motions and swallows. Reviewed tactile stimulation techniques. Infant remained actively feeding at time of LC exit. Encouraged to call for continued assist.     Manual Expression:  Expresses easily, Drops obtained, MOB demonstrates well , and MOB states she understands     Pump Arrangements:  Faxed to: Mommy [employer] and Breast pump requested:

## 2024-06-26 NOTE — LACTATION NOTE
This note was copied from a baby's chart.  Lactation Progress Note      Data:    F/U consult for primip on DOD with an infant born at 38.2 weeks gestation. MOB reports breastfeeding had been going well until today, now it is hurting when baby latches and nipple is creased with release. RN calling for consult to help mother latch w/o pain.          Action:    Introduced self & ensured name & lactation # is on whiteboard in room. Reviewed football position, how to support infants head, how to support the breast, and steps for a SHMUEL. Added pillow support to bring infant up next to the breast not under it and guided mothers hands for a SHMUEL, SRS noted right away. MOB states latch is painful. Educated & demonstrated how to break suction & try again, nipple creased slightly with release.     Checked infants mouth, slight posterior tie noted. Educated mother about this and suggested she speak with ped to determine if frenotomy is warranted. TABBY 6.     Took baby back to the breast & encouraged mother to try re-latching independently. MOB noted to be compressing breast so tightly, areola became very wrinkled, when baby latched MOB states it is very painful. Broke suction & suggested mother support breast gently & infant achieved a deep comfortable latch. Fed for 15 minutes total. Left nipple slightly creased with release.     Reviewed breastfeeding education; how the breasts work to make milk & protecting milk supply, breastfeeding recommendations for exclusivity and duration, what to expect with cluster feeding, and infant feeding cues.    Encouraged mother to allow infant to breast feed on demand anytime feeding cues are shown and if no feeding cues are shown to attempt to wake infant to feed every 2-3 hours. If infant is still too sleepy to latch to hand express colostrum into infants mouth for about ten minutes, then try again in 2-3 hours. After the first day of life to breast feed a minimum of 8-12 times a day per 24

## 2024-06-26 NOTE — PLAN OF CARE
Problem: Pain  Goal: Verbalizes/displays adequate comfort level or baseline comfort level  6/26/2024 0630 by Roxana Darby RN  Outcome: Progressing     Problem: Postpartum  Goal: Experiences normal postpartum course  Description:  Postpartum OB-Pregnancy care plan goal which identifies if the mother is experiencing a normal postpartum course  6/26/2024 0630 by Roxana Darby RN  Outcome: Progressing     Problem: Safety - Adult  Goal: Free from fall injury  6/26/2024 0630 by Roxana Darby RN  Outcome: Progressing     Problem: Discharge Planning  Goal: Discharge to home or other facility with appropriate resources  6/26/2024 0630 by Roxana Darby RN  Outcome: Progressing

## 2024-06-26 NOTE — LACTATION NOTE
This note was copied from a baby's chart.  LACTATION CONSULTATION      Follow-up Consult: Reason for Follow-up: assist with latching , assess needs , and provide education     Name: Richmond Causey       MRN: [id number]               YOB: 2025   Time of Birth: 5:00 AM   Gestational age: Gestational Age: 38w2d   Birth Weight: Birth Weight: 3.654 kg (8 lb 0.9 oz) Most Recent Weight: Weight: 3.654 kg (8 lb 0.9 oz) (Filed from Delivery Summary)   Weight Change from Birth: 0%            Maternal Assessment:      Maternal Data:   Information for the patient's mother:  Alyssa Causey [[id number]]   24 y.o.    /Para:   Information for the patient's mother:  Alyssa Causey [[id number]]        Information for the patient's mother:  Alyssa Causey [[id number]]   38w2d          Breast Assessment  Right Breast: WDL and Large  Right Nipple: Everts well   Right Areola: WDL   Right Nipple Comfort: comfortable  and 1st minute or less of breastfeeding   Right Nipple Integrity: Intact    Left Breast: WDL and Large  Left Nipple: Everts well   Left Areola: WDL   Left Nipple Comfort: sore  Left Nipple Integrity: Sore and pink     Infant Assessment:      DOL:10 hours       Feeding: Breastfeeding      Intervention during consultation:     Interventions Performed:   Assisted with breastfeeding  and Education     Latch & Positioning: Observed MOB to position infant to the right breast in a football hold. Reviewed position and hold. MOB did well positioning infant to the right breast in a football hold. MOB demonstrated hand expression well with large drops expressed. MOB able to get infant latched deeply after a couple of attempts and had short coordinated suck bursts off and on. Infant with active feeding for about 5 minutes then released the breast and appeared like needed to burp. Encouraged to keep infant up skin to skin and can attempt feeding again. Encouraged to call for assist. Name and

## 2024-06-26 NOTE — LACTATION NOTE
This note was copied from a baby's chart.      TABBY SCORE:___6___________________        Scoring of TABBY tool  A score of:   8 indicates normal tongue function;   6 or 7 are considered as borderline: suggest a                              'wait and see' approach with support for  breastfeeding positioning & attachment;   5 or below suggests that there is impairment of  tongue function.    Selection of infants for frenotomy  Assessment of tongue function is only one part of the feeding assessment and so the decision to divide a tongue-tie should be based on: assessment of breastfeeding: is there a feeding problem?

## 2024-06-27 PROCEDURE — 59400 OBSTETRICAL CARE: CPT | Performed by: OBSTETRICS & GYNECOLOGY

## 2024-06-27 PROCEDURE — 1220000000 HC SEMI PRIVATE OB R&B

## 2024-06-27 PROCEDURE — 6370000000 HC RX 637 (ALT 250 FOR IP): Performed by: OBSTETRICS & GYNECOLOGY

## 2024-06-27 RX ADMIN — DOCUSATE SODIUM 100 MG: 100 CAPSULE, LIQUID FILLED ORAL at 08:19

## 2024-06-27 RX ADMIN — WITCH HAZEL: 500 SOLUTION RECTAL; TOPICAL at 16:56

## 2024-06-27 RX ADMIN — FERROUS SULFATE TAB 325 MG (65 MG ELEMENTAL FE) 325 MG: 325 (65 FE) TAB at 08:20

## 2024-06-27 RX ADMIN — BENZOCAINE AND LEVOMENTHOL: 200; 5 SPRAY TOPICAL at 16:56

## 2024-06-27 RX ADMIN — ACETAMINOPHEN 1000 MG: 500 TABLET ORAL at 12:54

## 2024-06-27 RX ADMIN — DOCUSATE SODIUM 100 MG: 100 CAPSULE, LIQUID FILLED ORAL at 21:03

## 2024-06-27 RX ADMIN — IBUPROFEN 800 MG: 800 TABLET, FILM COATED ORAL at 16:07

## 2024-06-27 RX ADMIN — IBUPROFEN 800 MG: 800 TABLET, FILM COATED ORAL at 08:20

## 2024-06-27 RX ADMIN — IBUPROFEN 800 MG: 800 TABLET, FILM COATED ORAL at 00:17

## 2024-06-27 RX ADMIN — ACETAMINOPHEN 1000 MG: 500 TABLET ORAL at 21:03

## 2024-06-27 RX ADMIN — ACETAMINOPHEN 1000 MG: 500 TABLET ORAL at 04:41

## 2024-06-27 ASSESSMENT — PAIN DESCRIPTION - ORIENTATION
ORIENTATION: LOWER

## 2024-06-27 ASSESSMENT — PAIN DESCRIPTION - DESCRIPTORS
DESCRIPTORS: CRAMPING;TENDER;SORE
DESCRIPTORS: CRAMPING
DESCRIPTORS: CRAMPING
DESCRIPTORS: SORE
DESCRIPTORS: SORE;TENDER

## 2024-06-27 ASSESSMENT — PAIN SCALES - GENERAL
PAINLEVEL_OUTOF10: 5
PAINLEVEL_OUTOF10: 6
PAINLEVEL_OUTOF10: 5
PAINLEVEL_OUTOF10: 4
PAINLEVEL_OUTOF10: 6

## 2024-06-27 ASSESSMENT — PAIN DESCRIPTION - LOCATION
LOCATION: ABDOMEN
LOCATION: ABDOMEN;PERINEUM
LOCATION: ABDOMEN
LOCATION: ABDOMEN
LOCATION: PERINEUM
LOCATION: PERINEUM

## 2024-06-27 ASSESSMENT — PAIN - FUNCTIONAL ASSESSMENT
PAIN_FUNCTIONAL_ASSESSMENT: ACTIVITIES ARE NOT PREVENTED

## 2024-06-27 NOTE — LACTATION NOTE
This note was copied from a baby's chart.  LACTATION CONSULTATION      Follow-up Consult: Reason for Follow-up: assist with latching  and Maternal request      ID#:       Name: Richmond Causey       MRN: [id number]               YOB: 2025   Time of Birth: 5:00 AM   Gestational age: Gestational Age: 38w2d   Birth Weight: Birth Weight: 3.654 kg (8 lb 0.9 oz) Most Recent Weight: Weight: 3.555 kg (7 lb 13.4 oz)   Weight Change from Birth: -3%            Maternal Assessment:      Maternal Data:   Information for the patient's mother:  Alyssa Causey [[id number]]   24 y.o.    /Para:   Information for the patient's mother:  Alyssa Causey [[id number]]        Information for the patient's mother:  Alyssa Causey [[id number]]   38w2d          Breast Assessment    Left Breast: WDL large  Left Nipple: Everts well   Left Areola: WDL   Left Nipple Comfort: 1st minute or less of breastfeeding   Left Nipple Integrity: Intact, Red , and Sore     Infant Assessment:      DOL:Infant 33 hours old      Feeding: Breastfeeding      Nipple Shield in Use:No  Nipple Shield Size:      I&O adequacy:  Urine output: is established  Stool output: is established  Percent weight change from birthweight: -3%        Intervention during consultation:     Interventions Performed:   Assisted with breastfeeding , Lanolin provided and instructed on use, Hydrogel pads, Education , and Skin to skin     Latch & Positioning: Reinforced tips for optimal latch and close positioning to breast in cross cradle hold. MOB already with infant at breast needing some assistance to bring infant chin closer into breast, and with some coaching helping mob achieve deep latch. After 1 attempt, miya was achieved with srs observed and as noted at breast. MOB confirms latch is comfortable after 5-10 seconds of discomfort. Reports strong tugging noted.     Manual Expression:  MOB states she understands     Bedside Breast  Pump:   N/A    Education:  Latch & positioning , Feeding frequency & feeding cues , Exclusive breastfeeding , and Skin to skin           Action/Plan:  Breastfeed on cue and at least 8 times/24 hours, unlimited timing. May not nurse this often in the first 24 hours. Wake baby and offer breastfeeding if it has been 3 hours since the beginning of last feeding. Place infant skin to skin if infant will not breastfeed at 3 hours.   Hand express prior to latch to thao nipple and entice infant to the breast.   It is important to use gentle stimulation during feeding to promote active eating. Offer both breasts at every feeding. Burp infant in between sides. Alternate which breast is used first.   Offer STS often while awake. Mother holding infant skin to skin between feedings will promote milk supply and allow infant to rest more deeply.   Maintain a feeding log until infant is gaining weight and seen by primary care physician.   Request breastfeeding assistance from LC or RN as needed.     Feeding Plan reviewed with: Parents     Response:   Verbalized understanding of education and instruction, Active in care, Demonstrates latch well , and Pleased

## 2024-06-27 NOTE — PROGRESS NOTES
Department of Obstetrics and Gynecology  Labor and Delivery  Attending Post Partum Progress Note      SUBJECTIVE:   24 y/o  female S/P uncomplicated Vaginal Delivery. Pregnancy has been complicated by depression, anxiety, history of thyroid disease, and nausea/vomiting.  No current complaints are noted including headache, change in vision, fever, chills, chest pain, shortness of breath, nausea, vomiting, diarrhea or constipation. The patient denies any urinary complaints or calf tenderness.  Lochia is described as mild. The patient plans to breastfeed.  Impression:      OBJECTIVE:      Vitals:  /65   Pulse 93   Temp 98.7 °F (37.1 °C) (Oral)   Resp 16   Ht 1.753 m (5' 9\")   Wt 94.3 kg (208 lb)   LMP 2023 (Exact Date)   SpO2 98%   Breastfeeding Unknown   BMI 30.72 kg/m²     CONSTITUTIONAL:  awake, alert, cooperative, no apparent distress, and appears stated age  LUNGS:  No increased work of breathing, good air exchange, clear to auscultation bilaterally, no crackles or wheezing  CARDIOVASCULAR:  normal S1 and S2  ABDOMEN:  soft, non-distended, and non-tender, fundus firm below umbilicus  MUSCULOSKELETAL:  full range of motion noted  NEUROLOGIC:  Mental Status Exam:  Level of Alertness:   awake  Orientation:   person, place, time  Memory:   normal  Fund of Knowledge:  normal  Attention/Concentration:  normal  Language:  normal  SKIN:  normal skin color, texture, turgor    DATA:      tains abnormal data CBC auto differential  Order: [id number]  Status: Final result       Visible to patient: Yes (seen)       Next appt: 2024 at 01:10 PM in Obstetrics and Gynecology (Aby L [place], DO)    0 Result Notes          Component  Ref Range & Units 24 1412 24 0350 24 1754 24 1116 23 1004   WBC  4.0 - 11.0 K/uL 13.2 High  9.7 12.1 High  7.6 6.1   RBC  4.00 - 5.20 M/uL 3.56 Low  3.99 Low  3.74 Low  3.74 Low  4.27   Hemoglobin  12.0 - 16.0 g/dL 9.8 Low  11.4 Low  10.3

## 2024-06-27 NOTE — LACTATION NOTE
This note was copied from a baby's chart.  LACTATION CONSULTATION      Follow-up Consult: Reason for Follow-up: assist with latching , RN request , and infant just had frenotomy performed by NNP.         Name: Richmond Causey       MRN: [id number]               YOB: 2025   Time of Birth: 5:00 AM   Gestational age: Gestational Age: 38w2d   Birth Weight: Birth Weight: 3.654 kg (8 lb 0.9 oz) Most Recent Weight: Weight: 3.555 kg (7 lb 13.4 oz)   Weight Change from Birth: -3%            Maternal Assessment:      Maternal Data:   Information for the patient's mother:  Alyssa Causey [[id number]]   24 y.o.    /Para:   Information for the patient's mother:  Alyssa Causey [[id number]]        Information for the patient's mother:  Alyssa Causey [[id number]]   38w2d          Breast Assessment  Right Breast: WDL and Large  Right Nipple: Everts well  and Short  Right Areola: WDL   Right Nipple Comfort: Pain improved with position and latch assistance  Right Nipple Integrity: Red  and Sore    Left Breast: Large  Left Nipple: Everts well  and Short  Left Areola: WDL   Left Nipple Comfort:  did not observe feeding on left breast.  Left Nipple Integrity: Intact     Infant Assessment:      DOL:Infant 30 hours old.      Feeding: Breastfeeding       Nipple Shield in Use: Was provided by RN to aide in latching, sore nipple. Did not use at this consult. Education provided on indications for using shield.     I&O adequacy:  Urine output: is established  Stool output: is established  Percent weight change from birthweight: -3%     Oral Assessment:   Palate:intact   Frenulum:   Frenotomy Performed: Yes, Date         TABBY SCORE: 6 previous score prior to frenotomy, post score 7 by this LC NP attempted but not too much tissue to clip since very posterior.     Scoring of TABBY tool  A score of:   8 indicates normal tongue function;   6 or 7 are considered as borderline: suggest a

## 2024-06-27 NOTE — PROGRESS NOTES
Dr. Traylor aware of post partum depression score of 10. Will place order for social work consult.

## 2024-06-27 NOTE — LACTATION NOTE
This note was copied from a baby's chart.  LACTATION CONSULTATION      Follow-up Consult: Reason for Follow-up: Maternal request       Name: Richmond Causey       MRN: [id number]               YOB: 2025   Time of Birth: 5:00 AM   Gestational age: Gestational Age: 38w2d   Birth Weight: Birth Weight: 3.654 kg (8 lb 0.9 oz) Most Recent Weight: Weight: 3.555 kg (7 lb 13.4 oz)   Weight Change from Birth: -3%            Maternal Assessment:      Maternal Data:   Information for the patient's mother:  Alyssa Causey [[id number]]   24 y.o.    /Para:   Information for the patient's mother:  Alyssa Causey [[id number]]        Information for the patient's mother:  Alyssa Causey [[id number]]   38w2d          Breast Assessment  Right Breast: Breasts not assessed this encounter     Left Breast: WDL and Large  Left Nipple: Everts well   Left Areola: WDL   Left Nipple Comfort: sore  Left Nipple Integrity: Intact and pink     Infant Assessment:      DOL:2      Feeding: Breastfeeding      Nipple Shield in Use: No     I&O adequacy:  Urine output: is established  Stool output: is established  Percent weight change from birthweight: -3%     Oral Assessment:   Oral assessment not completed at this time.      Intervention during consultation:     Interventions Performed:   Education     Latch & Positioning: MOB called for LC to check latch. MOB with infant latched to the left breast in a cross cradle hold upon entry. Infant aligned to MOB well and appeared to have a deep latch with moderate coordinated suck bursts, long jaw motions and swallows. MOB reported that initial latch painful however improves shortly after. MOB with appropriate stimulation of infant as needed to maintain active feeding. At about 10 minutes of active feeding MOB reported that latch was becoming painful again. Instructed MOB that when latch becomes painful infant is losing deep latch and will need to be taken off before  more damage occurs. MOB released infant from the breast. He appeared satisfied and content. Infant remained with MOB. Encouraged to call for continued assist as needed. Name and number on white board.     Manual Expression:  MOB states she understands     Bedside Breast Pump:   N/A    Breast Shield Size:   N/A    Amount of milk expressed:   N/A    Pump Arrangements:  Faxed to: Mommy Xpress and Breast pump requested: Zomee Z2    Pump Distributed: Yes, Hildamee Z2 , and by earlier LC 6/27/2024    Education:  Latch & positioning  and Feeding frequency & feeding cues           Action/Plan:  Breastfeed on cue and at least 8 times/24 hours, unlimited timing. May not nurse this often in the first 24 hours. Wake baby and offer breastfeeding if it has been 3 hours since the beginning of last feeding. Place infant skin to skin if infant will not breastfeed at 3 hours.   Hand express prior to latch to thao nipple and entice infant to the breast.   It is important to use gentle stimulation during feeding to promote active eating. Offer both breasts at every feeding. Burp infant in between sides. Alternate which breast is used first.   Offer STS often while awake. Mother holding infant skin to skin between feedings will promote milk supply and allow infant to rest more deeply.   Maintain a feeding log until infant is gaining weight and seen by primary care physician.   Request breastfeeding assistance from LC or RN as needed.     Feeding Plan reviewed with: Mom    Response:   Verbalized understanding of education and instruction

## 2024-06-28 VITALS
DIASTOLIC BLOOD PRESSURE: 77 MMHG | SYSTOLIC BLOOD PRESSURE: 124 MMHG | HEIGHT: 69 IN | TEMPERATURE: 98.7 F | RESPIRATION RATE: 16 BRPM | OXYGEN SATURATION: 98 % | HEART RATE: 88 BPM | BODY MASS INDEX: 30.81 KG/M2 | WEIGHT: 208 LBS

## 2024-06-28 PROCEDURE — 99024 POSTOP FOLLOW-UP VISIT: CPT | Performed by: OBSTETRICS & GYNECOLOGY

## 2024-06-28 PROCEDURE — 6370000000 HC RX 637 (ALT 250 FOR IP): Performed by: OBSTETRICS & GYNECOLOGY

## 2024-06-28 RX ORDER — PSEUDOEPHEDRINE HCL 30 MG
100 TABLET ORAL 2 TIMES DAILY
Qty: 120 CAPSULE | Refills: 0 | Status: SHIPPED | OUTPATIENT
Start: 2024-06-28

## 2024-06-28 RX ORDER — IBUPROFEN 800 MG/1
800 TABLET ORAL EVERY 8 HOURS PRN
Qty: 120 TABLET | Refills: 0 | Status: SHIPPED | OUTPATIENT
Start: 2024-06-28 | End: 2024-08-07

## 2024-06-28 RX ADMIN — IBUPROFEN 800 MG: 800 TABLET, FILM COATED ORAL at 00:25

## 2024-06-28 RX ADMIN — IBUPROFEN 800 MG: 800 TABLET, FILM COATED ORAL at 10:34

## 2024-06-28 RX ADMIN — DOCUSATE SODIUM 100 MG: 100 CAPSULE, LIQUID FILLED ORAL at 10:34

## 2024-06-28 RX ADMIN — FERROUS SULFATE TAB 325 MG (65 MG ELEMENTAL FE) 325 MG: 325 (65 FE) TAB at 10:33

## 2024-06-28 RX ADMIN — ACETAMINOPHEN 1000 MG: 500 TABLET ORAL at 04:40

## 2024-06-28 ASSESSMENT — PAIN SCALES - GENERAL
PAINLEVEL_OUTOF10: 6
PAINLEVEL_OUTOF10: 6
PAINLEVEL_OUTOF10: 4

## 2024-06-28 ASSESSMENT — PAIN DESCRIPTION - LOCATION
LOCATION: ABDOMEN;PERINEUM
LOCATION: PERINEUM

## 2024-06-28 ASSESSMENT — PAIN DESCRIPTION - DESCRIPTORS: DESCRIPTORS: CRAMPING;DISCOMFORT

## 2024-06-28 NOTE — LACTATION NOTE
This note was copied from a baby's chart.  LACTATION CONSULTATION      Follow-up Consult: Reason for Follow-up: assess needs  and discharge education       ID#:       Name: Richmond Causey       MRN: [id number]               YOB: 2025   Time of Birth: 5:00 AM   Gestational age: Gestational Age: 38w2d   Birth Weight: Birth Weight: 3.654 kg (8 lb 0.9 oz) Most Recent Weight: Weight: 3.421 kg (7 lb 8.7 oz)   Weight Change from Birth: -6%            Maternal Assessment:      Maternal Data:   Information for the patient's mother:  Alyssa Causey [[id number]]   24 y.o.    /Para:   Information for the patient's mother:  Alyssa Causey [[id number]]        Information for the patient's mother:  Alyssa Causey [[id number]]   38w2d          Breast Assessment  Right Breast: Filling  Right Nipple: Everts well   Right Areola: WDL   Right Nipple Comfort: 1st minute or less of breastfeeding   Right Nipple Integrity: Intact    Left Breast: Filling  Left Nipple: Everts well   Left Areola: WDL   Left Nipple Comfort: comfortable   Left Nipple Integrity: Intact     Infant Assessment:      DOL:Infant 2 days old.      Feeding: Breastfeeding       Nipple Shield in Use: No  Nipple Shield Size:      I&O adequacy:  Urine output: is established  Stool output: is established  Percent weight change from birthweight: -6%     Intervention during consultation:     Interventions Performed:   Assisted with breastfeeding , Education , and home pump demonstration provided    Latch & Positioning: Observed mob independently latch infant to right breast in cross cradle hold. Infant opened mouth wide, miya was obtained with srs noted and several audible swallows heard. Infant remained latched to right breast for 12 minutes and continues after consult.    Manual Expression:  MOB demonstrates well     Bedside Breast Pump:   N/A    Breast Shield Size:   N/A    Amount of milk expressed:   N/A    Pump

## 2024-06-28 NOTE — DISCHARGE INSTRUCTIONS
Thank you for the opportunity to care for you and your family.    We hope that you are happy with the care we provided during your stay in the [facility]. We want to ensure that you have the help you need when you leave the hospital. If there is anything we can assist you with, please let us know.    Breastfeeding mothers may contact our lactation specialists with any problems or questions.  [facility] lactation services phone number is [phone].  Leave a message and your call will be returned.    Please refer to the information provided in the postpartum care booklet.  The following are warning signs to remember.    Call 911 if you have:    Chest pain or pressure  Shortness of breath, even at rest  Thoughts of harming yourself or others  Seizures    Call your healthcare provider if you have:    Temperature of 100.4 degrees or higher  Stitches that are not healing        -- Swelling, bleeding, drainage, foul odor, redness or warmth in/around your           stitches, staples, or incision (scar)        -- Bad smelling blood or discharge from the vagina  Vaginal bleeding that has increased         -- Soaking through one pad in an hour        -- You are passing clots larger than the size of a lemon  Red, warm tender area(s) in your breast or calf  Headache that does not get better, even after taking medicine; or headache with vision changes    Remember to notify all healthcare providers from your date of delivery to up to one year after giving birth!    CARING FOR YOURSELF        DIET/ACTIVITY    Eat a well balanced diet focusing on foods high in fiber and protein.  Drink plenty of fluids, especially water.  To avoid constipation you may take a mild stool softener as recommended by your doctor or midwife.  Gradually increase your activity. Resume an exercise regime only after being advised by your doctor or midwife.  When sitting or lying down, keep your legs elevated to reduce swelling.  Avoid  milk.  Avoid stimulation to your breasts. When showering, allow the water to strike only your back.  Wear a good fitting bra, such as a sports bra, until your milk dries up    OTHER REASONS TO CALL YOUR DOCTOR    Your abdomen is tender to the touch or you have pain that cannot be relieved.  Flu-like symptoms such as achy muscles and joints or you are experiencing extreme weakness or dizziness.  Persistent burning or increased urgency in urination.      LITERATURE PROVIDED    For Moms and Those Who Care About Them  Your 's Healthy Start, Grow Smart  Breastfeeding Best for Baby, Best for Mom.  [facility] Parent Information About Universal Hearing Screening  Controlling pain.    I have received the educational material listed above.  The  Channel has provided me with the opportunity to view instructional videos pertaining to infant care and the care of myself.    I verify that I have received the above information and have no further questions and feel confident to care for myself and my baby.    For more information about postpartum care, baby care and feeding, create a [facility] My Chart account, sign in and search using the magnifying glass, typing in postpartum, breast feeding or formula feeding.  [email or web address]

## 2024-06-28 NOTE — CARE COORDINATION
Social Work Consult/Assessment    Reason for Consult:PPD score of 10   Electronic record reviewed:yes    Delivery information:baby boy 2025 38w 2d Weight 8lb 0.9oz  Vag-Spont Apgar 8,9  Marital Status:    Mob's UDS on admission:Negative    Infant's UDS/Cord tox:NA     Spoke with Mob today explained SW services.  Present in the room:MOB and baby- FOB entered at end of assessment (went home to let dog out)  Living situation:MOB, FOB, baby  Address and phone: [address], [place] [id number] ph [phone]  Spouse or significant other: ROSEMARIE Nitin Causey [phone]  Children:1st time parents   Children's Protective Services involvement: NA  Support system:Spouse is supportive, recent moved from [facility] limited local support   Domestic Violence:Denies   Mental Health:(+) dx anxiety and depression. MOB reports prior to pregnancy was on medication, not currently. MOB reports not connect with psych team here and PCP will not prescribe psych meds. Writer supplied a provider list for follow up and Counseling resc. Feel like with these resc will have  no concerns, FOB is supportive and in tuned to mental health needs.   Post Partum Depression:reviewed, edu provided, aware to reach out to OB/GYN with concerns until can connect with mental health provider  Substance Abuse:Denies   Social Assistance Programs:NA  Supplies:reports has all needed supplies      Summary:Plan is for baby to discharge home with MOB when medically stable. MOB provided mental health resc ie counseling and psych. FOB supportive. Community resc provided. .SALAZAR Weber

## 2024-06-28 NOTE — LACTATION NOTE
This note was copied from a baby's chart.  LACTATION CONSULTATION      Follow-up Consult: Reason for Follow-up: assist with latching , assess needs , provide education, and Maternal request       Name: Richmond Causey       MRN: [id number]               YOB: 2025   Time of Birth: 5:00 AM   Gestational age: Gestational Age: 38w2d   Birth Weight: Birth Weight: 3.654 kg (8 lb 0.9 oz) Most Recent Weight: Weight: 3.555 kg (7 lb 13.4 oz)   Weight Change from Birth: -3%            Maternal Assessment:      Maternal Data:   Information for the patient's mother:  Alyssa Causey [[id number]]   24 y.o.    /Para:   Information for the patient's mother:  Alyssa Causey [[id number]]        Information for the patient's mother:  Alyssa Causey [[id number]]   38w2d          Breast Assessment  Right Breast: WDL and Large  Right Nipple: Everts well   Right Areola: WDL   Right Nipple Comfort: 1st minute or less of breastfeeding  and sore  Right Nipple Integrity: Intact and pink    Left Breast: Breasts not assessed this encounter      Infant Assessment:      DOL:2      Feeding: Breastfeeding     Nipple Shield in Use: No     I&O adequacy:  Urine output: is established  Stool output: is established  Percent weight change from birthweight: -3%     Oral Assessment:   Oral assessment not completed at this time.      Intervention during consultation:     Interventions Performed:   Assisted with breastfeeding , Hand expression, and Education     Latch & Positioning: MOB called for assist with latch. Reports trying to latch however infant will not really start feeding once latch. Assisted MOB to position to the right breast in a cross cradle hold. MOB positions infant well and provides appropriate support. MOB does well with nipple to nose and bringing infant up to the breast. Reviewed with MOB to provide deep compression to allow infant to get as much in his mouth as possible. Infant latched and

## 2024-06-28 NOTE — CARE COORDINATION
Consult noted. Attempted to see working with Lactation team. Will ct to attempt to assess.SALAZAR Weber

## 2024-06-28 NOTE — FLOWSHEET NOTE
Discharge Phone Call    Patient Name: Alyssa Causey     OB Care Provider: Vangie Traylor DO Discharge Date: 2024    Disposition of baby:    Phone Number: [phone] (home)     Attempts to Contact:  Date:    Caller  Date:    Caller  Date:    Caller    Information for the patient's :  Richmond Causey [[id number]]   Delivery Method: Vaginal, Spontaneous     1.  Now that you are at home is your pain being well controlled?   Y/N   If no, instruct to call       provider.      2. Are you breastfeeding?    Y/N    Do you need any extra support from our lactation staff?      Y/N    If yes, provide number for lactation.  3. Have you made or already had your first appointment with the baby's doctor? Y/N   If no, do      you know when to schedule it?  Y/N    4. Have you scheduled your follow-up appointment?  Y/N  If no, do you know when to schedule       it?    Y/N   If no, they can find it on printed discharge instructions.  5. Did staff discuss safe sleep during your stay? Y/N   6. Did we explain things in a way you could understand?  Y/N  7. Were we respectful of your preferences for labor and birth and include you in the plan of       care?  Y/N  If no, please explain _______________________________________________  8. Is there anyone in particular you would like to mention who provided care for you? _______      _________________________________________________________________________     9. Were you given a Post-Birth Warning Signs handout?  Y/N  Do you have it somewhere      easily accessible?  Y/N  If no, please send them a copy and ask them to put it somewhere      easily found.  10. Have you been crying excessively, having anger or mood swings that feel out of control, or       feel like you can't cope with caring for yourself or baby? Y/N   If yes, they may be showing       signs of postpartum depression and should call provider. There is also a        depression test on page C5

## 2024-06-28 NOTE — DISCHARGE SUMMARY
Department of Obstetrics and Gynecology  Postpartum Discharge Summary      Admit Date: 2024    Admit Diagnosis: Normal labor [O80, Z37.9]    Discharge Date: 24    Condition at Discharge: good    Discharge Diagnoses: spontaneous vaginal delivery    Discharge Disposition:  Home    Service: Obstetrics    Postpartum complications: none     Hospital Course: uncomplicated    [place] Data:  Information for the patient's :  Richmond Causey [[id number]]      Weight   Information for the patient's :  Richmond Causey [[id number]]      Apgars   Information for the patient's :  Richmond Causey [[id number]]       Disposition of Baby:  Home with mother    FU in 2 weeks in office    Current Discharge Medication List        START taking these medications    Details   docusate sodium (COLACE, DULCOLAX) 100 MG CAPS Take 100 mg by mouth 2 times daily  Qty: 120 capsule, Refills: 0      ibuprofen (ADVIL;MOTRIN) 800 MG tablet Take 1 tablet by mouth every 8 hours as needed for Pain  Qty: 120 tablet, Refills: 0           CONTINUE these medications which have NOT CHANGED    Details   acetaminophen (TYLENOL) 325 MG tablet Take 2 tablets by mouth every 6 hours as needed for Pain      diphenhydrAMINE (BENADRYL) 50 MG capsule Take 1 capsule by mouth every 6 hours as needed for Itching      ferrous sulfate (IRON 325) 325 (65 Fe) MG tablet Take 1 tablet by mouth daily (with breakfast)  Qty: 90 tablet, Refills: 1      Prenatal MV-Min-Fe Fum-FA-DHA (PRENATAL 1 PO) Take by mouth      doxylamine-pyridoxine 10-10 MG TBEC Take 1 tablet by mouth 3 times daily as needed (nausea) Start with 1 tablet at night and in am, add afternoon dose as needed  Qty: 60 tablet, Refills: 2    Associated Diagnoses: Nausea and vomiting during pregnancy      LAMOTRIGINE PO Take 400 mg by mouth daily             Electronically signed by Aby Sanderson DO on 2024 at 7:54 AM

## 2024-06-28 NOTE — PROGRESS NOTES
Department of Obstetrics and Gynecology  Labor and Delivery  Attending Post Partum Progress Note      SUBJECTIVE:   24 y/o  female S/P uncomplicated Vaginal Delivery.     Pregnancy has been complicated by depression, anxiety, history of thyroid disease, and nausea/vomiting.      No current complaints are noted including headache, change in vision, fever, chills, chest pain, shortness of breath, nausea, vomiting, diarrhea or constipation. The patient denies any urinary complaints or calf tenderness.  Lochia is described as mild. The patient plans to breastfeed.    OBJECTIVE:      Vitals:  /66   Pulse 86   Temp 97.9 °F (36.6 °C) (Oral)   Resp 16   Ht 1.753 m (5' 9\")   Wt 94.3 kg (208 lb)   LMP 2023 (Exact Date)   SpO2 98%   Breastfeeding Unknown   BMI 30.72 kg/m²     CONSTITUTIONAL:  awake, alert, cooperative, no apparent distress, and appears stated age  LUNGS:  No increased work of breathing, good air exchange, clear to auscultation bilaterally, no crackles or wheezing  CARDIOVASCULAR:  normal S1 and S2  ABDOMEN:  soft, non-distended, and non-tender, fundus firm below umbilicus  MUSCULOSKELETAL:  full range of motion noted  NEUROLOGIC:  Mental Status Exam:  Level of Alertness:   awake  Orientation:   person, place, time  Memory:   normal  Fund of Knowledge:  normal  Attention/Concentration:  normal  Language:  normal  SKIN:  normal skin color, texture, turgor    DATA:      tains abnormal data CBC auto differential  Order: [id number]  Status: Final result       Visible to patient: Yes (seen)       Next appt: 2024 at 01:10 PM in Obstetrics and Gynecology (Aby L Maria E, DO)    0 Result Notes          Component  Ref Range & Units 24 1412 24 0350 24 1754 24 1116 23 1004   WBC  4.0 - 11.0 K/uL 13.2 High  9.7 12.1 High  7.6 6.1   RBC  4.00 - 5.20 M/uL 3.56 Low  3.99 Low  3.74 Low  3.74 Low  4.27   Hemoglobin  12.0 - 16.0 g/dL 9.8 Low  11.4 Low  10.3 Low   11.1 Low  12.2   Hematocrit  36.0 - 48.0 % 29.3 Low  32.5 Low  30.2 Low  32.1 Low  35.4 Low    MCV  80.0 - 100.0 fL 82.5 81.4 80.7 85.8 82.9   MCH  26.0 - 34.0 pg 27.6 28.5 27.6 29.7 28.6   MCHC  31.0 - 36.0 g/dL 33.4 35.0 34.1 34.7 34.5   RDW  12.4 - 15.4 % 16.1 High  15.9 High  13.1 13.3 13.6   Platelets  135 - 450 K/uL 198 206 223 200 203   MPV  5.0 - 10.5 fL 8.9 8.8 8.1 8.7 9.0   Neutrophils %  % 84.0 71.5 78.2 79.2 73.1   Lymphocytes %  % 6.3 17.9 12.2 12.9 16.3   Monocytes %  % 8.8 9.0 8.3 6.4 8.9   Eosinophils %  % 0.8 0.8 1.0 0.9 1.2   Basophils %  % 0.1 0.8 0.3 0.6 0.5   Neutrophils Absolute  1.7 - 7.7 K/uL 11.1 High  6.9 9.5 High  6.0 4.5   Lymphocytes Absolute  1.0 - 5.1 K/uL 0.8 Low  1.7 1.5 1.0 1.0   Monocytes Absolute  0.0 - 1.3 K/uL 1.2 0.9 1.0 0.5 0.5   Eosinophils Absolute  0.0 - 0.6 K/uL 0.1 0.1 0.1 0.1 0.1   Basophils Absolute  0.0 - 0.2 K/uL 0.0 0.1 0.0 0.0 0.0   Resulting Agency [facility]              Specimen Collected: 24 14:12 EDT Last Resulted: 24 14:41 EDT               ASSESSMENT & PLAN:    Impression:  S/P Vaginal Delivery PPD #2  Acute blood loss anemia  Depression/anxiety  History of thyroid disease  [place]--male.  Frenotomy yesterday, working on feeding, desires circumcision    Plan:  DC to HOME   FU in office in 2 weeks   PP instructions reviewed     Aby Sanderson DO

## 2024-06-28 NOTE — LACTATION NOTE
with wide open gape. Within 10 seconds latch more tolerable for MOB. Infant with moderate coordinated suck bursts, long jaw motions and audible swallows. Infant maintaining spontaneous suck bursts. Infant remained actively feeding at time of LC exit. Encouraged to call for continued assist.     Manual Expression:  Expresses easily, Drops obtained, MOB demonstrates well , and MOB states she understands     Bedside Breast Pump:   N/A    Breast Shield Size:   N/A    Amount of milk expressed:   N/A    Education:  Latch & positioning , Feeding frequency & feeding cues , and Hand expression           Action/Plan:  Breastfeed on cue and at least 8 times/24 hours, unlimited timing. May not nurse this often in the first 24 hours. Wake baby and offer breastfeeding if it has been 3 hours since the beginning of last feeding. Place infant skin to skin if infant will not breastfeed at 3 hours.   Hand express prior to latch to thao nipple and entice infant to the breast.   It is important to use gentle stimulation during feeding to promote active eating. Offer both breasts at every feeding. Burp infant in between sides. Alternate which breast is used first.   Offer STS often while awake. Mother holding infant skin to skin between feedings will promote milk supply and allow infant to rest more deeply.   Maintain a feeding log until infant is gaining weight and seen by primary care physician.   Request breastfeeding assistance from LC or RN as needed.     Feeding Plan reviewed with: Mom    Response:   Verbalized understanding of education and instruction

## 2024-06-29 ENCOUNTER — LACTATION ENCOUNTER (OUTPATIENT)
Dept: LABOR AND DELIVERY | Age: 24
End: 2024-06-29

## 2024-06-29 NOTE — LACTATION NOTE
This note was copied from a baby's chart.        [facility] Lactation Services          Outpatient Lactation Assessment        Mother's Name:   Information for the patient's mother:  Alyssa Causey [[id number]]   24 y.o.  Baby's name: Richmond Mathias   OB Provider:DO Layton  Pediatrician: [facility] ([facility]) F: [phone]   Phone:  [phone]  Gestational Age: Gestational Age: 38w2d     Age: 3 days     Reason for Consultation: Maternal request for follow up  due to engorgement and difficulty latching infant. MOB called for lactation support at 1416. MOB very upset and worried that infant was not feeding well, breasts were too full, and only had 1 wet and 1 dirty diaper so far today. MOB very tearful and concerned so offered outpatient appt and MOB accepted.     Maternal History:   Delivery Information:  Born on 6/26/2025 at 5:00 AM  Delivery method: Vaginal, Spontaneous [250]  Additional Information:  Forceps attempted? No [0]  Vacuum extractor attempted? No [0]    Medications in use: Wellbutrin (L3)  and Lamictal, L-methylfolate, Trazodone    Maternal Assessment:     Breast Assessment  Right Breast: Large and moderately engorged   Right Nipple: Everts well  and Short  Right Areola: WDL   Right Nipple Comfort: sore  Right Nipple Integrity: Intact    Left Breast: Large and moderately engorged   Left Nipple: Everts well  and Short  Left Areola: WDL   Left Nipple Comfort: sore  Left Nipple Integrity: Intact    Equipment in use at home:  MOB using silverettes in between feeds. Has Lynnette manual pump and [place]. Instructed on use of Medela [place] pump.   Pumping:  as needed for comfort  /24 hours   Shield Size: 24mm used with manual pump, reviewed that may need to use smaller size once engorgement is resolved.   Supply:  ~2 oz with pump session in office. Has hand expressed several mls at home and collected milk in silverettes.     Infant Assessment:     Birth Weight: Birth Weight:

## 2024-07-01 ENCOUNTER — TELEPHONE (OUTPATIENT)
Dept: OBGYN CLINIC | Age: 24
End: 2024-07-01

## 2024-07-01 ENCOUNTER — LACTATION ENCOUNTER (OUTPATIENT)
Dept: OBGYN | Age: 24
End: 2024-07-01

## 2024-07-01 NOTE — TELEPHONE ENCOUNTER
Patient delivered on 6/26/24 with Dr. Traylor.   She went out today for baby's first appointment and upon returning home she noticed after eating lunch that she didn't feel well.  Patient took 1 800 mg ibuprofen around 3 pm but hasn't had much relief.  Patient said she overall just doesn't feel well and is very achy.  Patient report no other symptoms at this time and confirmed that she is getting plenty of fluids.  She would like to know if she can take anything else or how often can she be taking the ibuprofen.

## 2024-07-01 NOTE — LACTATION NOTE
This note was copied from a baby's chart.        [facility] Lactation Services          Outpatient Lactation Assessment        Mother's Name:   Information for the patient's mother:  Alyssa Causey [[id number]]   24 y.o.  Baby's name: Stew Causey    OB Provider:DO Layton  Pediatrician: [facility] ([facility]) F: [phone]   Phone:  [phone]  Gestational Age: Gestational Age: 38w2d     Age: 5 days     Reason for Consultation: Maternal request for follow up . Request weight check, and would like lactation to evaluate infant latch and weighted feeding.  MOB was seen here at [facility] care on Saturday 6/29 for engorgement and difficulty latching to breast. Since that visit, mob reports infant latch has improved, and engorgement is still present but manageable. Needing to use a nipple shield at times on left breast, but latching without nipple shield to right breast.     Maternal History:   Delivery Information:  Born on 6/26/2025 at 5:00 AM  Delivery method: Vaginal, Spontaneous [250]  Additional Information:  Forceps attempted? No [0]  Vacuum extractor attempted? No [0]  Breech:   Complications:     Medications in use: Wellbutrin (L3) Lamictal, L-methylfolate, Trazodone     Maternal Assessment:     Breast Assessment  Right Breast: Large and minimal engorged   Right Nipple: Everts well  and Short  Right Areola: WDL   Right Nipple Comfort: comfortable   Right Nipple Integrity: Intact    Left Breast: Large and moderate engorged   Left Nipple: Everts well  and Short  Left Areola: WDL   Left Nipple Comfort: sore  Left Nipple Integrity: Intact    Equipment in use at home:  MOB using silverettes in between feeds, and collected some EBM when removing. Using Medela hand pump size 24 flange as needed to help with engorgement, removing about 1/2 to ounce as needed for comfort.   Pumping:  as needed for comfort  /24 hours   Shield Size: 24mm used with manual pump, reviewed that may need to

## 2024-07-02 ENCOUNTER — TELEPHONE (OUTPATIENT)
Dept: PRIMARY CARE CLINIC | Age: 24
End: 2024-07-02

## 2024-07-10 ENCOUNTER — POSTPARTUM VISIT (OUTPATIENT)
Dept: OBGYN CLINIC | Age: 24
End: 2024-07-10

## 2024-07-10 ENCOUNTER — TELEPHONE (OUTPATIENT)
Dept: OBGYN CLINIC | Age: 24
End: 2024-07-10

## 2024-07-10 VITALS
TEMPERATURE: 97.3 F | HEART RATE: 80 BPM | DIASTOLIC BLOOD PRESSURE: 64 MMHG | WEIGHT: 193.2 LBS | BODY MASS INDEX: 28.53 KG/M2 | SYSTOLIC BLOOD PRESSURE: 126 MMHG

## 2024-07-10 DIAGNOSIS — F33.1 MODERATE EPISODE OF RECURRENT MAJOR DEPRESSIVE DISORDER (HCC): ICD-10-CM

## 2024-07-10 DIAGNOSIS — F41.9 ANXIETY: ICD-10-CM

## 2024-07-10 PROCEDURE — 0503F POSTPARTUM CARE VISIT: CPT | Performed by: OBSTETRICS & GYNECOLOGY

## 2024-07-10 NOTE — TELEPHONE ENCOUNTER
Received patients [facility] paperwork today. Passed off to Vangie to complete.  Patient is needing paperwork completed and faxed by Friday.  Please call her once they have been sent.

## 2024-07-11 NOTE — TELEPHONE ENCOUNTER
FMLA completed and faxed to patients employer.  Called patient and relayed information. Patient voiced understanding.

## 2024-07-19 ENCOUNTER — INITIAL CONSULT (OUTPATIENT)
Dept: SURGERY | Age: 24
End: 2024-07-19

## 2024-07-19 VITALS
HEIGHT: 69 IN | DIASTOLIC BLOOD PRESSURE: 78 MMHG | SYSTOLIC BLOOD PRESSURE: 132 MMHG | BODY MASS INDEX: 27.81 KG/M2 | WEIGHT: 187.8 LBS

## 2024-07-19 DIAGNOSIS — L02.31 ABSCESS OF GLUTEAL CLEFT: Primary | ICD-10-CM

## 2024-07-19 RX ORDER — CEPHALEXIN 500 MG/1
500 CAPSULE ORAL 4 TIMES DAILY
Qty: 28 CAPSULE | Refills: 0 | Status: SHIPPED | OUTPATIENT
Start: 2024-07-19 | End: 2024-07-26

## 2024-07-19 NOTE — PROGRESS NOTES
Surgical History:   Procedure Laterality Date    WISDOM TOOTH EXTRACTION       Family History   Problem Relation Age of Onset    Diabetes type 2  Father     Cancer Sister     Ulcerative Colitis Brother      Social History     Socioeconomic History    Marital status:      Spouse name: Not on file    Number of children: Not on file    Years of education: Not on file    Highest education level: Not on file   Occupational History    Not on file   Tobacco Use    Smoking status: Never    Smokeless tobacco: Never   Vaping Use    Vaping Use: Never used   Substance and Sexual Activity    Alcohol use: Not Currently    Drug use: Never    Sexual activity: Yes     Partners: Male   Other Topics Concern    Not on file   Social History Narrative    Not on file     Social Determinants of Health     Financial Resource Strain: Not on file   Food Insecurity: No Food Insecurity (6/26/2024)    Hunger Vital Sign     Worried About Running Out of Food in the Last Year: Never true     Ran Out of Food in the Last Year: Never true   Transportation Needs: No Transportation Needs (6/26/2024)    PRAPARE - Transportation     Lack of Transportation (Medical): No     Lack of Transportation (Non-Medical): No   Physical Activity: Not on file   Stress: Not on file   Social Connections: Not on file   Intimate Partner Violence: Not on file   Housing Stability: Low Risk  (6/26/2024)    Housing Stability Vital Sign     Unable to Pay for Housing in the Last Year: No     Number of Places Lived in the Last Year: 1     Unstable Housing in the Last Year: No          A review of the patient's record including allergies, medication list, tobacco history, family history, problem list, medical history and social history has been completed and updates made to the patient's EMR where indicated.     Vitals:    07/19/24 1504   BP: 132/78   Site: Right Wrist   Position: Sitting   Cuff Size: Medium Adult   Weight: 85.2 kg (187 lb 12.8 oz)   Height: 1.753 m (5'

## 2024-07-22 ENCOUNTER — TELEPHONE (OUTPATIENT)
Dept: SURGERY | Age: 24
End: 2024-07-22

## 2024-07-22 PROBLEM — Z34.03 PRENATAL CARE, FIRST PREGNANCY, THIRD TRIMESTER: Status: RESOLVED | Noted: 2024-03-22 | Resolved: 2024-07-22

## 2024-07-22 PROBLEM — R06.02 SHORTNESS OF BREATH DUE TO PREGNANCY: Status: RESOLVED | Noted: 2024-04-26 | Resolved: 2024-07-22

## 2024-07-22 PROBLEM — O47.9 BRAXTON HICKS CONTRACTIONS: Status: RESOLVED | Noted: 2024-06-25 | Resolved: 2024-07-22

## 2024-07-22 PROBLEM — Z34.93 PRENATAL CARE IN THIRD TRIMESTER: Status: RESOLVED | Noted: 2024-04-26 | Resolved: 2024-07-22

## 2024-07-22 PROBLEM — Z37.9 NORMAL LABOR: Status: RESOLVED | Noted: 2024-06-26 | Resolved: 2024-07-22

## 2024-07-22 PROBLEM — O21.9 NAUSEA AND VOMITING DURING PREGNANCY: Status: RESOLVED | Noted: 2024-03-22 | Resolved: 2024-07-22

## 2024-07-22 PROBLEM — O46.93 VAGINAL BLEEDING IN PREGNANCY, THIRD TRIMESTER: Status: RESOLVED | Noted: 2024-06-05 | Resolved: 2024-07-22

## 2024-07-22 PROBLEM — O26.899 SHORTNESS OF BREATH DUE TO PREGNANCY: Status: RESOLVED | Noted: 2024-04-26 | Resolved: 2024-07-22

## 2024-07-22 ASSESSMENT — ENCOUNTER SYMPTOMS
SHORTNESS OF BREATH: 0
CONSTIPATION: 1
RESPIRATORY NEGATIVE: 1
NAUSEA: 0
ABDOMINAL PAIN: 0
VOMITING: 0
DIARRHEA: 0

## 2024-07-22 NOTE — TELEPHONE ENCOUNTER
Pt called stating she is having increased pain from her sebaceous cyst I&D from last Friday - Scheduled pt for an OV tomorrow with Dr Camacho to have it looked at

## 2024-07-22 NOTE — PROGRESS NOTES
Subjective   Patient ID: Alyssa Causey is a 23 y.o. female.    HPI  24 y/o  female presents for postpartum visit.  Patient is 2 weeks S/P uncomplicated Vaginal Delivery on 24.    Pregnancy has been complicated by depression, anxiety, history of thyroid disease, and nausea/vomiting.   Admits to some headaches (possibly secondary to fatigue and hydration issues--notices improvement with sleep and hydration.  Admits to constipation as well (increased with iron use).   Denies change in vision, fever, chills, chest pain, shortness of breath, nausea, vomiting, diarrhea, dysuria and hematuria.   Has been emotional/anxious.  Crying at home.  Has seen psychiatry.  Started on Zoloft--50 mg.  Has not resumed lamictal yet.  Has noted an improvement in moods   Breast feeding.   Plans on utilizing the [employer] model for contraception.   Last pap smear was normal in 2023.     Review of Systems   Constitutional:  Positive for fatigue. Negative for activity change, appetite change, chills, fever and unexpected weight change.   Eyes:  Negative for visual disturbance.   Respiratory: Negative.  Negative for shortness of breath.    Cardiovascular: Negative.  Negative for chest pain.   Gastrointestinal:  Positive for constipation. Negative for abdominal pain, diarrhea, nausea and vomiting.   Endocrine: Negative for cold intolerance and heat intolerance.   Genitourinary:  Negative for difficulty urinating, dysuria, hematuria, pelvic pain, vaginal bleeding, vaginal discharge and vaginal pain.   Skin: Negative.    Neurological:  Positive for headaches.   Hematological:  Does not bruise/bleed easily.   Psychiatric/Behavioral:  Positive for dysphoric mood. Negative for suicidal ideas. The patient is nervous/anxious.           Objective   Physical Exam  Vitals and nursing note reviewed.   Constitutional:       General: She is not in acute distress.     Appearance: She is well-developed. She is not ill-appearing,

## 2024-07-23 ENCOUNTER — TELEPHONE (OUTPATIENT)
Dept: SURGERY | Age: 24
End: 2024-07-23

## 2024-07-23 ENCOUNTER — ANESTHESIA (OUTPATIENT)
Dept: OPERATING ROOM | Age: 24
End: 2024-07-23
Payer: COMMERCIAL

## 2024-07-23 ENCOUNTER — OFFICE VISIT (OUTPATIENT)
Dept: SURGERY | Age: 24
End: 2024-07-23

## 2024-07-23 ENCOUNTER — ANESTHESIA EVENT (OUTPATIENT)
Dept: OPERATING ROOM | Age: 24
End: 2024-07-23
Payer: COMMERCIAL

## 2024-07-23 ENCOUNTER — HOSPITAL ENCOUNTER (OUTPATIENT)
Age: 24
Setting detail: OBSERVATION
Discharge: HOME | End: 2024-07-24
Attending: SURGERY | Admitting: SURGERY
Payer: COMMERCIAL

## 2024-07-23 VITALS
HEIGHT: 69 IN | SYSTOLIC BLOOD PRESSURE: 141 MMHG | HEART RATE: 83 BPM | WEIGHT: 189 LBS | DIASTOLIC BLOOD PRESSURE: 87 MMHG | BODY MASS INDEX: 27.99 KG/M2

## 2024-07-23 DIAGNOSIS — L02.31 ABSCESS OF GLUTEAL CLEFT: Primary | ICD-10-CM

## 2024-07-23 DIAGNOSIS — G89.18 ACUTE POST-OPERATIVE PAIN: ICD-10-CM

## 2024-07-23 DIAGNOSIS — L02.31 ABSCESS OF BUTTOCK, LEFT: Primary | ICD-10-CM

## 2024-07-23 DIAGNOSIS — K61.1 PERI-RECTAL ABSCESS: ICD-10-CM

## 2024-07-23 PROBLEM — L05.91 INFECTED PILONIDAL CYST: Status: ACTIVE | Noted: 2024-07-23

## 2024-07-23 LAB — HCG UR QL: NEGATIVE

## 2024-07-23 PROCEDURE — 2709999900 HC NON-CHARGEABLE SUPPLY: Performed by: SURGERY

## 2024-07-23 PROCEDURE — 6360000002 HC RX W HCPCS: Performed by: SURGERY

## 2024-07-23 PROCEDURE — 2500000003 HC RX 250 WO HCPCS: Performed by: ANESTHESIOLOGY

## 2024-07-23 PROCEDURE — 87070 CULTURE OTHR SPECIMN AEROBIC: CPT

## 2024-07-23 PROCEDURE — A4217 STERILE WATER/SALINE, 500 ML: HCPCS | Performed by: SURGERY

## 2024-07-23 PROCEDURE — 87185 SC STD ENZYME DETCJ PER NZM: CPT

## 2024-07-23 PROCEDURE — 3600000012 HC SURGERY LEVEL 2 ADDTL 15MIN: Performed by: SURGERY

## 2024-07-23 PROCEDURE — 84703 CHORIONIC GONADOTROPIN ASSAY: CPT

## 2024-07-23 PROCEDURE — 2500000003 HC RX 250 WO HCPCS: Performed by: SURGERY

## 2024-07-23 PROCEDURE — 87205 SMEAR GRAM STAIN: CPT

## 2024-07-23 PROCEDURE — 3700000000 HC ANESTHESIA ATTENDED CARE: Performed by: SURGERY

## 2024-07-23 PROCEDURE — 10061 I&D ABSCESS COMP/MULTIPLE: CPT | Performed by: SURGERY

## 2024-07-23 PROCEDURE — 87076 CULTURE ANAEROBE IDENT EACH: CPT

## 2024-07-23 PROCEDURE — 3700000001 HC ADD 15 MINUTES (ANESTHESIA): Performed by: SURGERY

## 2024-07-23 PROCEDURE — G0378 HOSPITAL OBSERVATION PER HR: HCPCS

## 2024-07-23 PROCEDURE — 7100000001 HC PACU RECOVERY - ADDTL 15 MIN: Performed by: SURGERY

## 2024-07-23 PROCEDURE — 2580000003 HC RX 258: Performed by: ANESTHESIOLOGY

## 2024-07-23 PROCEDURE — 6360000002 HC RX W HCPCS: Performed by: ANESTHESIOLOGY

## 2024-07-23 PROCEDURE — 2580000003 HC RX 258: Performed by: SURGERY

## 2024-07-23 PROCEDURE — 87075 CULTR BACTERIA EXCEPT BLOOD: CPT

## 2024-07-23 PROCEDURE — 7100000000 HC PACU RECOVERY - FIRST 15 MIN: Performed by: SURGERY

## 2024-07-23 PROCEDURE — 6370000000 HC RX 637 (ALT 250 FOR IP): Performed by: SURGERY

## 2024-07-23 PROCEDURE — 99024 POSTOP FOLLOW-UP VISIT: CPT | Performed by: SURGERY

## 2024-07-23 PROCEDURE — 3600000002 HC SURGERY LEVEL 2 BASE: Performed by: SURGERY

## 2024-07-23 RX ORDER — SODIUM CHLORIDE 0.9 % (FLUSH) 0.9 %
5-40 SYRINGE (ML) INJECTION PRN
Status: CANCELLED | OUTPATIENT
Start: 2024-07-23

## 2024-07-23 RX ORDER — ONDANSETRON 2 MG/ML
4 INJECTION INTRAMUSCULAR; INTRAVENOUS
Status: DISCONTINUED | OUTPATIENT
Start: 2024-07-23 | End: 2024-07-23 | Stop reason: HOSPADM

## 2024-07-23 RX ORDER — DEXAMETHASONE SODIUM PHOSPHATE 4 MG/ML
INJECTION, SOLUTION INTRA-ARTICULAR; INTRALESIONAL; INTRAMUSCULAR; INTRAVENOUS; SOFT TISSUE PRN
Status: DISCONTINUED | OUTPATIENT
Start: 2024-07-23 | End: 2024-07-23 | Stop reason: SDUPTHER

## 2024-07-23 RX ORDER — SUCCINYLCHOLINE CHLORIDE 20 MG/ML
INJECTION INTRAMUSCULAR; INTRAVENOUS PRN
Status: DISCONTINUED | OUTPATIENT
Start: 2024-07-23 | End: 2024-07-23 | Stop reason: SDUPTHER

## 2024-07-23 RX ORDER — SODIUM CHLORIDE 0.9 % (FLUSH) 0.9 %
5-40 SYRINGE (ML) INJECTION EVERY 12 HOURS SCHEDULED
Status: DISCONTINUED | OUTPATIENT
Start: 2024-07-23 | End: 2024-07-24 | Stop reason: HOSPADM

## 2024-07-23 RX ORDER — SODIUM CHLORIDE 0.9 % (FLUSH) 0.9 %
5-40 SYRINGE (ML) INJECTION EVERY 12 HOURS SCHEDULED
Status: DISCONTINUED | OUTPATIENT
Start: 2024-07-23 | End: 2024-07-23 | Stop reason: HOSPADM

## 2024-07-23 RX ORDER — OXYCODONE HYDROCHLORIDE 5 MG/1
10 TABLET ORAL EVERY 4 HOURS PRN
Status: DISCONTINUED | OUTPATIENT
Start: 2024-07-23 | End: 2024-07-24 | Stop reason: HOSPADM

## 2024-07-23 RX ORDER — ACETAMINOPHEN 325 MG/1
650 TABLET ORAL EVERY 4 HOURS PRN
Status: DISCONTINUED | OUTPATIENT
Start: 2024-07-23 | End: 2024-07-24 | Stop reason: HOSPADM

## 2024-07-23 RX ORDER — SODIUM CHLORIDE, SODIUM LACTATE, POTASSIUM CHLORIDE, CALCIUM CHLORIDE 600; 310; 30; 20 MG/100ML; MG/100ML; MG/100ML; MG/100ML
INJECTION, SOLUTION INTRAVENOUS CONTINUOUS PRN
Status: DISCONTINUED | OUTPATIENT
Start: 2024-07-23 | End: 2024-07-23 | Stop reason: SDUPTHER

## 2024-07-23 RX ORDER — SODIUM CHLORIDE 9 MG/ML
INJECTION, SOLUTION INTRAVENOUS PRN
Status: DISCONTINUED | OUTPATIENT
Start: 2024-07-23 | End: 2024-07-23 | Stop reason: HOSPADM

## 2024-07-23 RX ORDER — OXYCODONE HYDROCHLORIDE 5 MG/1
5 TABLET ORAL EVERY 4 HOURS PRN
Status: DISCONTINUED | OUTPATIENT
Start: 2024-07-23 | End: 2024-07-24 | Stop reason: HOSPADM

## 2024-07-23 RX ORDER — MORPHINE SULFATE 2 MG/ML
2 INJECTION, SOLUTION INTRAMUSCULAR; INTRAVENOUS
Status: DISCONTINUED | OUTPATIENT
Start: 2024-07-23 | End: 2024-07-24 | Stop reason: HOSPADM

## 2024-07-23 RX ORDER — ONDANSETRON 2 MG/ML
INJECTION INTRAMUSCULAR; INTRAVENOUS PRN
Status: DISCONTINUED | OUTPATIENT
Start: 2024-07-23 | End: 2024-07-23 | Stop reason: SDUPTHER

## 2024-07-23 RX ORDER — LABETALOL HYDROCHLORIDE 5 MG/ML
5 INJECTION, SOLUTION INTRAVENOUS EVERY 10 MIN PRN
Status: DISCONTINUED | OUTPATIENT
Start: 2024-07-23 | End: 2024-07-23 | Stop reason: HOSPADM

## 2024-07-23 RX ORDER — ONDANSETRON 4 MG/1
4 TABLET, ORALLY DISINTEGRATING ORAL EVERY 8 HOURS PRN
Status: DISCONTINUED | OUTPATIENT
Start: 2024-07-23 | End: 2024-07-24 | Stop reason: HOSPADM

## 2024-07-23 RX ORDER — METOCLOPRAMIDE HYDROCHLORIDE 5 MG/ML
10 INJECTION INTRAMUSCULAR; INTRAVENOUS ONCE
Status: COMPLETED | OUTPATIENT
Start: 2024-07-23 | End: 2024-07-23

## 2024-07-23 RX ORDER — PROPOFOL 10 MG/ML
INJECTION, EMULSION INTRAVENOUS PRN
Status: DISCONTINUED | OUTPATIENT
Start: 2024-07-23 | End: 2024-07-23 | Stop reason: SDUPTHER

## 2024-07-23 RX ORDER — FENTANYL CITRATE 50 UG/ML
INJECTION, SOLUTION INTRAMUSCULAR; INTRAVENOUS PRN
Status: DISCONTINUED | OUTPATIENT
Start: 2024-07-23 | End: 2024-07-23 | Stop reason: SDUPTHER

## 2024-07-23 RX ORDER — ROCURONIUM BROMIDE 10 MG/ML
INJECTION, SOLUTION INTRAVENOUS PRN
Status: DISCONTINUED | OUTPATIENT
Start: 2024-07-23 | End: 2024-07-23 | Stop reason: SDUPTHER

## 2024-07-23 RX ORDER — BUPIVACAINE HYDROCHLORIDE AND EPINEPHRINE 5; 5 MG/ML; UG/ML
INJECTION, SOLUTION PERINEURAL PRN
Status: DISCONTINUED | OUTPATIENT
Start: 2024-07-23 | End: 2024-07-23 | Stop reason: HOSPADM

## 2024-07-23 RX ORDER — SODIUM CHLORIDE 9 MG/ML
INJECTION, SOLUTION INTRAVENOUS PRN
Status: DISCONTINUED | OUTPATIENT
Start: 2024-07-23 | End: 2024-07-24 | Stop reason: HOSPADM

## 2024-07-23 RX ORDER — SODIUM CHLORIDE 0.9 % (FLUSH) 0.9 %
5-40 SYRINGE (ML) INJECTION PRN
Status: DISCONTINUED | OUTPATIENT
Start: 2024-07-23 | End: 2024-07-24 | Stop reason: HOSPADM

## 2024-07-23 RX ORDER — SODIUM CHLORIDE 0.9 % (FLUSH) 0.9 %
5-40 SYRINGE (ML) INJECTION EVERY 12 HOURS SCHEDULED
Status: CANCELLED | OUTPATIENT
Start: 2024-07-23

## 2024-07-23 RX ORDER — DIPHENHYDRAMINE HYDROCHLORIDE 50 MG/ML
12.5 INJECTION INTRAMUSCULAR; INTRAVENOUS
Status: DISCONTINUED | OUTPATIENT
Start: 2024-07-23 | End: 2024-07-23 | Stop reason: HOSPADM

## 2024-07-23 RX ORDER — ONDANSETRON 2 MG/ML
4 INJECTION INTRAMUSCULAR; INTRAVENOUS EVERY 6 HOURS PRN
Status: DISCONTINUED | OUTPATIENT
Start: 2024-07-23 | End: 2024-07-24 | Stop reason: HOSPADM

## 2024-07-23 RX ORDER — NALOXONE HYDROCHLORIDE 0.4 MG/ML
INJECTION, SOLUTION INTRAMUSCULAR; INTRAVENOUS; SUBCUTANEOUS PRN
Status: DISCONTINUED | OUTPATIENT
Start: 2024-07-23 | End: 2024-07-23 | Stop reason: HOSPADM

## 2024-07-23 RX ORDER — MAGNESIUM HYDROXIDE 1200 MG/15ML
LIQUID ORAL CONTINUOUS PRN
Status: DISCONTINUED | OUTPATIENT
Start: 2024-07-23 | End: 2024-07-23 | Stop reason: HOSPADM

## 2024-07-23 RX ORDER — MEPERIDINE HYDROCHLORIDE 50 MG/ML
12.5 INJECTION INTRAMUSCULAR; INTRAVENOUS; SUBCUTANEOUS EVERY 5 MIN PRN
Status: DISCONTINUED | OUTPATIENT
Start: 2024-07-23 | End: 2024-07-23 | Stop reason: HOSPADM

## 2024-07-23 RX ORDER — CEFAZOLIN SODIUM IN 0.9 % NACL 2 G/100 ML
2000 PLASTIC BAG, INJECTION (ML) INTRAVENOUS
Status: COMPLETED | OUTPATIENT
Start: 2024-07-23 | End: 2024-07-23

## 2024-07-23 RX ORDER — SODIUM CHLORIDE 9 MG/ML
INJECTION, SOLUTION INTRAVENOUS PRN
Status: CANCELLED | OUTPATIENT
Start: 2024-07-23

## 2024-07-23 RX ORDER — OXYCODONE HYDROCHLORIDE 5 MG/1
5 TABLET ORAL PRN
Status: DISCONTINUED | OUTPATIENT
Start: 2024-07-23 | End: 2024-07-23 | Stop reason: HOSPADM

## 2024-07-23 RX ORDER — SODIUM CHLORIDE 0.9 % (FLUSH) 0.9 %
5-40 SYRINGE (ML) INJECTION PRN
Status: DISCONTINUED | OUTPATIENT
Start: 2024-07-23 | End: 2024-07-23 | Stop reason: HOSPADM

## 2024-07-23 RX ORDER — MORPHINE SULFATE 4 MG/ML
4 INJECTION, SOLUTION INTRAMUSCULAR; INTRAVENOUS
Status: DISCONTINUED | OUTPATIENT
Start: 2024-07-23 | End: 2024-07-24 | Stop reason: HOSPADM

## 2024-07-23 RX ORDER — OXYCODONE HYDROCHLORIDE 5 MG/1
10 TABLET ORAL PRN
Status: DISCONTINUED | OUTPATIENT
Start: 2024-07-23 | End: 2024-07-23 | Stop reason: HOSPADM

## 2024-07-23 RX ADMIN — METOCLOPRAMIDE HYDROCHLORIDE 10 MG: 5 INJECTION INTRAMUSCULAR; INTRAVENOUS at 18:48

## 2024-07-23 RX ADMIN — FENTANYL CITRATE 50 MCG: 50 INJECTION, SOLUTION INTRAMUSCULAR; INTRAVENOUS at 19:25

## 2024-07-23 RX ADMIN — Medication 2000 MG: at 19:33

## 2024-07-23 RX ADMIN — FAMOTIDINE 20 MG: 10 INJECTION, SOLUTION INTRAVENOUS at 18:47

## 2024-07-23 RX ADMIN — Medication 10 ML: at 22:00

## 2024-07-23 RX ADMIN — ONDANSETRON 4 MG: 2 INJECTION INTRAMUSCULAR; INTRAVENOUS at 19:25

## 2024-07-23 RX ADMIN — ROCURONIUM BROMIDE 10 MG: 10 INJECTION, SOLUTION INTRAVENOUS at 19:25

## 2024-07-23 RX ADMIN — SUCCINYLCHOLINE CHLORIDE 100 MG: 20 INJECTION INTRAMUSCULAR; INTRAVENOUS at 19:25

## 2024-07-23 RX ADMIN — OXYCODONE 5 MG: 5 TABLET ORAL at 21:40

## 2024-07-23 RX ADMIN — DEXAMETHASONE SODIUM PHOSPHATE 10 MG: 4 INJECTION, SOLUTION INTRA-ARTICULAR; INTRALESIONAL; INTRAMUSCULAR; INTRAVENOUS; SOFT TISSUE at 19:25

## 2024-07-23 RX ADMIN — FENTANYL CITRATE 50 MCG: 50 INJECTION, SOLUTION INTRAMUSCULAR; INTRAVENOUS at 19:43

## 2024-07-23 RX ADMIN — PROPOFOL 200 MG: 10 INJECTION, EMULSION INTRAVENOUS at 19:25

## 2024-07-23 RX ADMIN — ROCURONIUM BROMIDE 25 MG: 10 INJECTION, SOLUTION INTRAVENOUS at 19:28

## 2024-07-23 RX ADMIN — SODIUM CHLORIDE, SODIUM LACTATE, POTASSIUM CHLORIDE, CALCIUM CHLORIDE: 600; 310; 30; 20 INJECTION, SOLUTION INTRAVENOUS at 19:19

## 2024-07-23 RX ADMIN — PIPERACILLIN AND TAZOBACTAM 4500 MG: 4; .5 INJECTION, POWDER, LYOPHILIZED, FOR SOLUTION INTRAVENOUS at 21:56

## 2024-07-23 NOTE — ANESTHESIA PRE PROCEDURE
Department of Anesthesiology  Preprocedure Note       Name:  Alyssa Causey   Age:  23 y.o.  :  2000                                          MRN:  [id number]         Date:  2024      Surgeon: Surgeon(s):  Bartolo Camacho MD    Procedure: Procedure(s):  INCISION AND DRAINAGE BUTTOCK ABSCESS    Medications prior to admission:   Prior to Admission medications    Medication Sig Start Date End Date Taking? Authorizing Provider   sertraline (ZOLOFT) 50 MG tablet Take 1 tablet by mouth daily    Yi Walden MD   cephALEXin (KEFLEX) 500 MG capsule Take 1 capsule by mouth 4 times daily for 7 days 24  Bartolo Camacho MD   docusate sodium (COLACE, DULCOLAX) 100 MG CAPS Take 100 mg by mouth 2 times daily 24   Aby Sanderson DO   ibuprofen (ADVIL;MOTRIN) 800 MG tablet Take 1 tablet by mouth every 8 hours as needed for Pain 24  Aby Sanderson DO   acetaminophen (TYLENOL) 325 MG tablet Take 2 tablets by mouth every 6 hours as needed for Pain    Yi Walden MD   diphenhydrAMINE (BENADRYL) 50 MG capsule Take 1 capsule by mouth every 6 hours as needed for Itching    Yi Walden MD   ferrous sulfate (IRON 325) 325 (65 Fe) MG tablet Take 1 tablet by mouth daily (with breakfast) 24   Maria Victoria Fung DO   Prenatal MV-Min-Fe Fum-FA-DHA (PRENATAL 1 PO) Take by mouth    Yi Walden MD   doxylamine-pyridoxine 10-10 MG TBEC Take 1 tablet by mouth 3 times daily as needed (nausea) Start with 1 tablet at night and in am, add afternoon dose as needed  Patient not taking: Reported on 7/10/2024 11/29/23   Aby Sanderson DO   LAMOTRIGINE PO Take 400 mg by mouth daily  Patient not taking: Reported on 7/10/2024 7/8/23   Yi Walden MD       Current medications:    Current Facility-Administered Medications   Medication Dose Route Frequency Provider Last Rate Last Admin    famotidine (PEPCID) 20 mg in sodium chloride (PF)

## 2024-07-23 NOTE — PROGRESS NOTES
Pt direct admitted to PACU to prep for surgery. Consents reviewed. IV placed in right hand. at bedside

## 2024-07-23 NOTE — TELEPHONE ENCOUNTER
Called and spoke w/ pt - informed her that Med Surg called and her room number is 526 - I asked pt to go to the check-in/registration desk since pt is already over at [facility]

## 2024-07-23 NOTE — PROGRESS NOTES
Patient returns for re-check after I&D of left gluteal abscess, at which time I actually did not find any karyn purulent fluid.  Since then she has been on oral antibiotics.    She has noted steadily increasing/worsening pain, very difficult to sit/lay on the area.  Denies drainage.    Exam:  alert, mild distress         L buttock - increased erythema, edema, swelling of the area around the incision made last week, very tender to palpation.    Impression:  likely retained/undrained underlying abscess, increasing in severity of symptoms    Plan:  will take to Operating Room for surgical I&D under anesthesia.  We discussed the technical aspects of the procedure, risks and complications (bleeding, further infection, further need for surgery).  Patient appears to understand, asks appropriate questions, and agrees to proceed.    DTW

## 2024-07-23 NOTE — TELEPHONE ENCOUNTER
Called [phone] for a med surg bed for pt - Nurse Elena said they will call pt directly when a bed becomes available - Dr Camacho plans on doing pts surgery today

## 2024-07-23 NOTE — PATIENT INSTRUCTIONS
[facility]  Phone: [phone]  Fax: [phone]    You will be scheduled for surgery with Dr. Camacho.   The office will call you with the date and time that surgery is scheduled.  Please take note of these instructions for surgery:  You should have nothing by mouth after midnight the night before your surgery - this includes no food or water.   Your surgery will be cancelled if you have taken anything by mouth after midnight, NO exceptions.   You will need to have a history and physical prior to your surgery. This will need to be completed up to 30 days before your surgery. This H/P can be completed by your family doctor or the hospital.   IF you take coumadin (warfarin), please stop taking this medication 5 days prior to your surgery.   IF you take plavix, please stop taking this 7 days prior to your surgery.   Please contact our office if you have a pacemaker or defibrillator.  IF you are allergic to latex, please tell our office prior to your surgery. This is important in know before scheduling your surgery.  IF you are having an out patient surgery, you will need someone available to drive you home after your surgery, and to also stay with you for the rest of the day.   IF you are having a surgery requiring an inpatient stay in the hospital, you will need someone to drive you home upon discharge from the hospital.  Please contact Dr. Camacho's assistant Maria Alejandra if you have any questions or concerns.  Please call the office with any changes in your symptoms or further questions/concerns. [phone]

## 2024-07-24 VITALS
RESPIRATION RATE: 16 BRPM | HEART RATE: 67 BPM | DIASTOLIC BLOOD PRESSURE: 72 MMHG | TEMPERATURE: 98.1 F | WEIGHT: 188.49 LBS | SYSTOLIC BLOOD PRESSURE: 119 MMHG | BODY MASS INDEX: 27.82 KG/M2 | OXYGEN SATURATION: 95 %

## 2024-07-24 PROCEDURE — 96366 THER/PROPH/DIAG IV INF ADDON: CPT

## 2024-07-24 PROCEDURE — 6370000000 HC RX 637 (ALT 250 FOR IP): Performed by: SURGERY

## 2024-07-24 PROCEDURE — 99238 HOSP IP/OBS DSCHRG MGMT 30/<: CPT | Performed by: SURGERY

## 2024-07-24 PROCEDURE — 6360000002 HC RX W HCPCS: Performed by: SURGERY

## 2024-07-24 PROCEDURE — 2580000003 HC RX 258: Performed by: SURGERY

## 2024-07-24 PROCEDURE — G0378 HOSPITAL OBSERVATION PER HR: HCPCS

## 2024-07-24 PROCEDURE — APPNB45 APP NON BILLABLE 31-45 MINUTES: Performed by: CLINICAL NURSE SPECIALIST

## 2024-07-24 PROCEDURE — 96365 THER/PROPH/DIAG IV INF INIT: CPT

## 2024-07-24 RX ORDER — OXYCODONE HYDROCHLORIDE 5 MG/1
5 TABLET ORAL EVERY 6 HOURS PRN
Qty: 12 TABLET | Refills: 0 | Status: SHIPPED | OUTPATIENT
Start: 2024-07-24 | End: 2024-07-27

## 2024-07-24 RX ORDER — CEPHALEXIN 500 MG/1
500 CAPSULE ORAL 2 TIMES DAILY
Qty: 20 CAPSULE | Refills: 0 | Status: SHIPPED | OUTPATIENT
Start: 2024-07-24 | End: 2024-08-03

## 2024-07-24 RX ADMIN — OXYCODONE 5 MG: 5 TABLET ORAL at 04:09

## 2024-07-24 RX ADMIN — PIPERACILLIN AND TAZOBACTAM 3375 MG: 3; .375 INJECTION, POWDER, LYOPHILIZED, FOR SOLUTION INTRAVENOUS at 05:36

## 2024-07-24 RX ADMIN — OXYCODONE 10 MG: 5 TABLET ORAL at 09:36

## 2024-07-24 RX ADMIN — ACETAMINOPHEN 650 MG: 325 TABLET ORAL at 07:56

## 2024-07-24 NOTE — PLAN OF CARE
Pt scoring pain on 0-10 scale. Pain medications given per MAR. Pt instructed to call out when pain level increasing. Call light within reach.     Problem: Pain  Goal: Verbalizes/displays adequate comfort level or baseline comfort level  7/24/2024 1031 by Nani Umanzor, RN  Outcome: Progressing  7/23/2024 2321 by Maria Victoria Dhillon, RN  Outcome: Progressing  Flowsheets (Taken 7/23/2024 2321)  Verbalizes/displays adequate comfort level or baseline comfort level: Assess pain using appropriate pain scale

## 2024-07-24 NOTE — ACP (ADVANCE CARE PLANNING)
Advance Care Planning     General Advance Care Planning (ACP) Conversation    Date of Conversation: 7/24/2024  Conducted with: Patient with Decision Making Capacity  Other persons present: None    Healthcare Decision Maker: No healthcare decision makers have been documented.  Click here to complete HealthCare Decision Makers including selection of the Healthcare Decision Maker Relationship (ie \"Primary\")   Today we documented Decision Maker(s) consistent with Legal Next of Kin hierarchy.    Content/Action Overview:  Has ACP document(s) on file - reflects the patient's care preferences  Reviewed DNR/DNI and patient elects Full Code (Attempt Resuscitation)        Length of Voluntary ACP Conversation in minutes:  <16 minutes (Non-Billable)    CHELSEY LIRA RN

## 2024-07-24 NOTE — PLAN OF CARE
Problem: Discharge Planning  Goal: Discharge to home or other facility with appropriate resources  Outcome: Progressing  Flowsheets (Taken 7/23/2024 2307)  Discharge to home or other facility with appropriate resources: Identify barriers to discharge with patient and caregiver     Problem: Pain  Goal: Verbalizes/displays adequate comfort level or baseline comfort level  Outcome: Progressing  Flowsheets (Taken 7/23/2024 2321)  Verbalizes/displays adequate comfort level or baseline comfort level: Assess pain using appropriate pain scale     Problem: Cardiovascular - Adult  Goal: Maintains optimal cardiac output and hemodynamic stability  Outcome: Progressing  Flowsheets (Taken 7/23/2024 2307)  Maintains optimal cardiac output and hemodynamic stability: Monitor blood pressure and heart rate     Problem: Skin/Tissue Integrity - Adult  Goal: Skin integrity remains intact  Outcome: Progressing  Flowsheets (Taken 7/23/2024 2307)  Skin Integrity Remains Intact: Monitor for areas of redness and/or skin breakdown

## 2024-07-24 NOTE — PROGRESS NOTES
Patient admitted to room 526 from PACU.  Patient oriented to room, call light, bed rails, phone, lights and bathroom.  Patient instructed about the schedule of the day including: vital sign frequency, lab draws, possible tests, frequency of MD and staff rounds, including RN/MD rounding together at bedside, daily weights, and I &O's.  Patient instructed about prescribed diet, how to order meals, and television. Bed locked, in lowest position, side rails up 2/4, call light within reach.  Will continue to monitor.

## 2024-07-24 NOTE — PROGRESS NOTES
[facility] GENERAL SURGERY    Surgery Progress Note           POD # 1    PATIENT NAME: Alyssa Causey     TODAY'S DATE: 7/24/2024    INTERVAL HISTORY:    Pt  doing okay, pain controlled.     OBJECTIVE:   VITALS:  /72   Pulse 67   Temp 98.1 °F (36.7 °C) (Oral)   Resp 18   Wt 85.5 kg (188 lb 7.9 oz)   SpO2 95%   Breastfeeding Yes   BMI 27.82 kg/m²     INTAKE/OUTPUT:    I/O last 3 completed shifts:  In: 700 [I.V.:600; IV Piggyback:100]  Out: 5 [Blood:5]  No intake/output data recorded.              CONSTITUTIONAL:  awake and alert    INCISION: packing removed, no purulent drainage. Packing replaced with aquacel silver and dry dressing         ASSESSMENT AND PLAN:  23 y.o. female status post INCISION AND DRAINAGE BUTTOCK ABSCESS     Packing replaced.     Plan for d/c to home on oral antibiotics later today    Electronically signed by GRACE Raymundo CNP       Surgery Staff    I have examined this patient, and read and agree with the note by Hanny Stacy CNP from today; more than half of the total time was spent by me on the encounter.     KIRK JIMENEZ MD

## 2024-07-24 NOTE — OP NOTE
Operative Note      Patient: Alyssa Causey  YOB: 2000  MRN: [id number]    Date of Procedure: 7/23/2024    Pre-Op Diagnosis Codes:     * Michelle-rectal abscess [K61.1]    Post-Op Diagnosis:  left buttock abscess       Procedure(s):  INCISION AND DRAINAGE BUTTOCK ABSCESS    Surgeon(s):  Kirk Camacho MD    Assistant:   Surgical Assistant: Hoa Hernández    Anesthesia: General    Estimated Blood Loss (mL): less than 50     Complications: None    Specimens:   ID Type Source Tests Collected by Time Destination   1 : left buttocks abscess Specimen Buttock CULTURE, SURGICAL CamachoKirk MD 7/23/2024 1942        Implants:  * No implants in log *      Drains: * No LDAs found *    Findings:  Infection Present At Time Of Surgery (PATOS) (choose all levels that have infection present):  - Superficial Infection (skin/subcutaneous) present as evidenced by pus  Other Findings:     Left buttock abscess w/ pus    Detailed Description of Procedure:   After informed consent was obtained, patient taken to Operating Room.  She was placed under general anesthesia without difficulty, and then rotated onto the operating table in the prone position, with appropriate padding to all pressure points.  The buttocks were then prepped and draped in sterile fashion.  The previous left paramedian 1cm incision was diffusely infiltrated with local anesthesia.  The incision was reopened sharply, with the immediate release of large volume of pus.  A culture was obtained.  The underlying cavity was probed for any loculations, the skin incision widened slightly and then the cavity copiously irrigated.  Iodine packing was placed followed by a sterile dressing.  Patient tolerated the procedure well, returned to the supine position, extubated and taken to the Recovery Room in stable condition.     Electronically signed by KIRK CAMACHO MD on 7/23/2024 at 9:06 PM

## 2024-07-24 NOTE — PROGRESS NOTES
Received pt from OR to PACU bay 7 Report received from OR RN and anesthesiologist. Pt denies pain / nausea.

## 2024-07-24 NOTE — DISCHARGE SUMMARY
Surgery Discharge Summary    Patient Identification  Alyssa Causey is a 23 y.o. female.  :  2000  Admit Date:  2024    Discharge date:  2024                                    Disposition: home    Discharge Diagnoses:   Principal Problem:    Abscess of gluteal cleft  Active Problems:    Abscess of buttock, left  Resolved Problems:    * No resolved hospital problems. *      Discharge condition: good    Discharge Medications:     Current Discharge Medication List        CONTINUE these medications which have NOT CHANGED    Details   sertraline (ZOLOFT) 50 MG tablet Take 1 tablet by mouth daily      docusate sodium (COLACE, DULCOLAX) 100 MG CAPS Take 100 mg by mouth 2 times daily  Qty: 120 capsule, Refills: 0      ibuprofen (ADVIL;MOTRIN) 800 MG tablet Take 1 tablet by mouth every 8 hours as needed for Pain  Qty: 120 tablet, Refills: 0      acetaminophen (TYLENOL) 325 MG tablet Take 2 tablets by mouth every 6 hours as needed for Pain      diphenhydrAMINE (BENADRYL) 50 MG capsule Take 1 capsule by mouth every 6 hours as needed for Itching      ferrous sulfate (IRON 325) 325 (65 Fe) MG tablet Take 1 tablet by mouth daily (with breakfast)  Qty: 90 tablet, Refills: 1      Prenatal MV-Min-Fe Fum-FA-DHA (PRENATAL 1 PO) Take by mouth      doxylamine-pyridoxine 10-10 MG TBEC Take 1 tablet by mouth 3 times daily as needed (nausea) Start with 1 tablet at night and in am, add afternoon dose as needed  Qty: 60 tablet, Refills: 2    Associated Diagnoses: Nausea and vomiting during pregnancy      LAMOTRIGINE PO Take 400 mg by mouth daily                Current Discharge Medication List        START taking these medications    Details   oxyCODONE (ROXICODONE) 5 MG immediate release tablet Take 1 tablet by mouth every 6 hours as needed for Pain for up to 3 days. Max Daily Amount: 20 mg  Qty: 12 tablet, Refills: 0    Comments: Reduce doses taken as pain becomes manageable  Associated Diagnoses: Abscess of

## 2024-07-24 NOTE — PROGRESS NOTES
4 Eyes Skin Assessment     NAME:  Alyssa Causey  YOB: 2000  MEDICAL RECORD NUMBER:  [id number]    The patient is being assessed for  Admission    I agree that at least one RN has performed a thorough Head to Toe Skin Assessment on the patient. ALL assessment sites listed below have been assessed.      Areas assessed by both nurses:    Head, Face, Ears, Shoulders, Back, Chest, Arms, Elbows, Hands, Sacrum. Buttock, Coccyx, Ischium, Legs. Feet and Heels, and Under Medical Devices         Does the Patient have a Wound? surgical       Flo Prevention initiated by RN: No  Wound Care Orders initiated by RN: No    Pressure Injury (Stage 3,4, Unstageable, DTI, NWPT, and Complex wounds) if present, place Wound referral order by RN under : No    New Ostomies, if present place, Ostomy referral order under : No     Nurse 1 eSignature: Electronically signed by Maria Victoria Dhillon RN on 7/23/24 at 9:04 PM EDT    **SHARE this note so that the co-signing nurse can place an eSignature**    Nurse 2 eSignature: Electronically signed by Maya Mason RN on 7/23/24 at 11:11 PM EDT

## 2024-07-24 NOTE — CARE COORDINATION
Case Management Assessment  Initial Evaluation    Date/Time of Evaluation: 7/24/2024 9:34 AM  Assessment Completed by: CHELSEY LIRA RN    If patient is discharged prior to next notation, then this note serves as note for discharge by case management.    Patient Name: Alyssa Causey                   YOB: 2000  Diagnosis: Michelle-rectal abscess [K61.1]  Infected pilonidal cyst [L05.91]  Abscess of buttock, left [L02.31]                   Date / Time: 7/23/2024  5:03 PM    Patient Admission Status: Observation   Readmission Risk (Low < 19, Mod (19-27), High > 27): Readmission Risk Score: 1.3    Current PCP: Gilbert Ayon, DO  PCP verified by CM? Yes    Chart Reviewed: Yes      History Provided by: Patient, Medical Record  Patient Orientation: Alert and Oriented    Patient Cognition: Alert    Hospitalization in the last 30 days (Readmission):  Yes    If yes, Readmission Assessment in CM Navigator will be completed.    Advance Directives:      Code Status: Full Code   Patient's Primary Decision Maker is: Legal Next of Kin      Discharge Planning:    Patient lives with: Spouse/Significant Other Type of Home: House  Primary Care Giver: Self  Patient Support Systems include: Spouse/Significant Other, Parent   Current Financial resources: None  Current community resources: None (n/a)  Current services prior to admission: None            Current DME:              Type of Home Care services:  None    ADLS  Prior functional level: Independent in ADLs/IADLs  Current functional level: Independent in ADLs/IADLs    PT AM-PAC:   /24  OT AM-PAC:   /24    Family can provide assistance at DC: Yes  Would you like Case Management to discuss the discharge plan with any other family members/significant others, and if so, who? No  Plans to Return to Present Housing: Yes  Other Identified Issues/Barriers to RETURNING to current housing: None   Potential Assistance needed at discharge: N/A

## 2024-07-25 ENCOUNTER — TELEPHONE (OUTPATIENT)
Dept: PRIMARY CARE CLINIC | Age: 24
End: 2024-07-25

## 2024-07-25 NOTE — TELEPHONE ENCOUNTER
Care Transitions Initial Follow Up Call    Outreach made within 2 business days of discharge: Yes    Patient: Alyssa Causey Patient : 2000   MRN: [id number]  Reason for Admission: Abscess of gluteal cleft   Discharge Date: 24       Spoke with: Patient chooses to f/u with Gen Surgery and will f/u with our office as needed.     Discharge department/facility:   2024 - 2024 (22 hours) [facility] Interactive Patient Contact:  Was patient able to fill all prescriptions: Yes  Was patient instructed to bring all medications to the follow-up visit: No:   Is patient taking all medications as directed in the discharge summary? Yes  Does patient understand their discharge instructions: Yes  Does patient have questions or concerns that need addressed prior to 7-14 day follow up office visit:     Additional needs identified to be addressed with provider         Scheduled appointment with PCP within 7-14 days    Follow Up  Future Appointments   Date Time Provider Department Center   2024 12:45 PM Bartolo Camacho MD AND  & [place] [facility]   2024 10:10 AM Aby Sanderson DO [facility]       Sharmin Connor LPN

## 2024-07-28 LAB
BACTERIA SPEC AEROBE CULT: ABNORMAL
BACTERIA SPEC AEROBE CULT: ABNORMAL
BACTERIA SPEC ANAEROBE CULT: ABNORMAL
BACTERIA SPEC ANAEROBE CULT: ABNORMAL
GRAM STN SPEC: ABNORMAL
ORGANISM: ABNORMAL

## 2024-07-30 ENCOUNTER — OFFICE VISIT (OUTPATIENT)
Dept: SURGERY | Age: 24
End: 2024-07-30

## 2024-07-30 VITALS
DIASTOLIC BLOOD PRESSURE: 68 MMHG | SYSTOLIC BLOOD PRESSURE: 110 MMHG | WEIGHT: 188 LBS | BODY MASS INDEX: 27.85 KG/M2 | HEIGHT: 69 IN

## 2024-07-30 DIAGNOSIS — Z09 POSTOP CHECK: Primary | ICD-10-CM

## 2024-07-30 PROCEDURE — 99024 POSTOP FOLLOW-UP VISIT: CPT | Performed by: SURGERY

## 2024-07-30 NOTE — PROGRESS NOTES
Surgery Post-op Progress Note    HPI:  Notes reviewed, and agree with documentation in pt's chart.   Postoperative Follow-up: Patient presents for 2 week follow-up status post I&D paranatal cleft abscess . Keeping packing into wound but having difficulty getting packing in any more.  Denies fever, chills, pain    ROS:    10 point review of systems performed; please refer to HPI with pertinent positives, all other ROS are negative    A review of the patient's record including allergies, medication list, tobacco history, family history, problem list, medical history and social history has been completed and updates made to the patient's EMR where indicated.     PE:   CONSTITUTIONAL:  awake and alert        INCISION: clean, no drainage, open, good granulation tissue, healing      ASSESSMENT:   Diagnosis Orders   1. Postop check        Wound is filling in, closing down nicely         PLAN:    No need for packing any longer  Keep wound clean, dry and cover with any type of dressing at home  Follow up as needed

## 2024-08-06 NOTE — ANESTHESIA POSTPROCEDURE EVALUATION
Department of Anesthesiology  Postprocedure Note    Patient: Alyssa Causey  MRN: [id number]  YOB: 2000  Date of evaluation: 8/6/2024    Procedure Summary       Date: 07/23/24 Room / Location: [address]    Anesthesia Start: 1919 Anesthesia Stop: 2003    Procedure: INCISION AND DRAINAGE BUTTOCK ABSCESS Diagnosis:       Michelle-rectal abscess      (Michelle-rectal abscess [K61.1])    Surgeons: Bartolo Camacho MD Responsible Provider: Renny Rodríguez MD    Anesthesia Type: general ASA Status: 2 - Emergent            Anesthesia Type: No value filed.    Leyla Phase I: Leyla Score: 10    Elyla Phase II:      Anesthesia Post Evaluation    Comments: Postoperative Anesthesia Note    Name:    Alyssa Causey  MRN:      [id number]    Patient Vitals in the past 12 hrs:     LABS:    CBC  Lab Results       Component                Value               Date/Time                  WBC                      13.2 (H)            06/26/2024 02:12 PM        HGB                      9.8 (L)             06/26/2024 02:12 PM        HCT                      29.3 (L)            06/26/2024 02:12 PM        PLT                      198                 06/26/2024 02:12 PM   RENAL  Lab Results       Component                Value               Date/Time                  NA                       133 (L)             05/30/2024 05:54 PM        K                        3.6                 05/30/2024 05:54 PM        CL                       99                  05/30/2024 05:54 PM        CO2                      19 (L)              05/30/2024 05:54 PM        BUN                      6 (L)               05/30/2024 05:54 PM        CREATININE               <0.5 (L)            05/30/2024 05:54 PM        GLUCOSE                  79                  05/30/2024 05:54 PM   COAGS  No results found for: \"PROTIME\", \"INR\", \"APTT\"    Intake & Output:  @24HRIO@    Nausea & Vomiting:  No    Level of Consciousness:

## 2024-08-09 ENCOUNTER — POSTPARTUM VISIT (OUTPATIENT)
Dept: OBGYN CLINIC | Age: 24
End: 2024-08-09

## 2024-08-09 VITALS
BODY MASS INDEX: 27.31 KG/M2 | HEART RATE: 90 BPM | TEMPERATURE: 97.7 F | WEIGHT: 185 LBS | DIASTOLIC BLOOD PRESSURE: 82 MMHG | SYSTOLIC BLOOD PRESSURE: 126 MMHG

## 2024-08-09 DIAGNOSIS — F41.9 ANXIETY: ICD-10-CM

## 2024-08-09 DIAGNOSIS — Z30.09 FAMILY PLANNING: ICD-10-CM

## 2024-08-09 ASSESSMENT — ENCOUNTER SYMPTOMS
CONSTIPATION: 1
VOMITING: 0
NAUSEA: 0
DIARRHEA: 0
SHORTNESS OF BREATH: 0
ABDOMINAL PAIN: 0
RESPIRATORY NEGATIVE: 1

## 2024-08-09 NOTE — PROGRESS NOTES
Subjective   Patient ID: Alyssa Causey is a 23 y.o. female.    HPI  24 y/o  female presents for postpartum visit.  Patient is 6 weeks S/P uncomplicated Vaginal Delivery on 24.      Pregnancy has been complicated by depression, anxiety, history of thyroid disease, and nausea/vomiting.   Admits to some headaches (possibly secondary to fatigue and hydration issues--notices improvement with sleep and hydration.  Admits to constipation as well (increased with iron use).   Denies change in vision, fever, chills, chest pain, shortness of breath, nausea, vomiting, diarrhea, dysuria and hematuria.   Has been emotional/anxious.  Crying at home.  Has seen psychiatry.  Started on Zoloft--50 mg.  Has not resumed lamictal yet.  Has noted an improvement in moods   Breast feeding.   Plans on utilizing the [employer] model for contraception.   Last pap smear was normal in 2023.     Review of Systems   Constitutional:  Positive for fatigue. Negative for activity change, appetite change, chills, fever and unexpected weight change.   Eyes:  Negative for visual disturbance.   Respiratory: Negative.  Negative for shortness of breath.    Cardiovascular: Negative.  Negative for chest pain.   Gastrointestinal:  Positive for constipation. Negative for abdominal pain, diarrhea, nausea and vomiting.   Endocrine: Negative for cold intolerance and heat intolerance.   Genitourinary:  Negative for difficulty urinating, dysuria, hematuria, pelvic pain, vaginal bleeding, vaginal discharge and vaginal pain.   Skin: Negative.    Neurological:  Positive for headaches.   Hematological:  Does not bruise/bleed easily.   Psychiatric/Behavioral:  Positive for dysphoric mood. Negative for suicidal ideas. The patient is nervous/anxious.           Objective   Physical Exam  Vitals and nursing note reviewed.   Constitutional:       General: She is not in acute distress.     Appearance: She is well-developed. She is not ill-appearing,

## 2024-09-05 ENCOUNTER — TELEPHONE (OUTPATIENT)
Dept: SURGERY | Age: 24
End: 2024-09-05

## 2024-09-05 NOTE — TELEPHONE ENCOUNTER
Pt called to state that she fears her abscess has returned. There is no drainage but there is hardness, redness, and bruise-like pain.

## 2024-09-06 ENCOUNTER — OFFICE VISIT (OUTPATIENT)
Dept: SURGERY | Age: 24
End: 2024-09-06
Payer: COMMERCIAL

## 2024-09-06 VITALS
WEIGHT: 187 LBS | HEIGHT: 69 IN | DIASTOLIC BLOOD PRESSURE: 70 MMHG | BODY MASS INDEX: 27.7 KG/M2 | SYSTOLIC BLOOD PRESSURE: 122 MMHG

## 2024-09-06 DIAGNOSIS — L02.31 ABSCESS OF GLUTEAL CLEFT: Primary | ICD-10-CM

## 2024-09-06 PROCEDURE — 99212 OFFICE O/P EST SF 10 MIN: CPT | Performed by: SURGERY

## 2024-09-06 RX ORDER — BUSPIRONE HYDROCHLORIDE 5 MG/1
5 TABLET ORAL 2 TIMES DAILY
COMMUNITY

## 2024-09-07 NOTE — PROGRESS NOTES
Patient is ~1 1/2 months s/p buttock/gluteal cleft I&D and uneventful healing.  Was doing well until a few days ago when she began feeling similar mild dull pain in the same area of the gluteal cleft.  Denies drainage, fever, chills.  Presents to assess for recurrent abscess.    Exam:  left paramedian buttock w/ well-healed incision/scar, no surrounding erythema, edema, very mild tenderness to palpation over the scar, no induration    Impression:  no recurrent abscess at this time    Plan:  monitor for improvement vs worsening of her symptoms.  If symptoms do worsen to suggest a new abscess forming, then she will return to office for re-evaluation and possible repeat I&D.  Patient indicates she understands, asks appropriate questions, and agrees with the plan.    Will call patient on Monday to check her status.  If having persistent pain, will resume oral antibiotics as well.

## 2024-12-02 ENCOUNTER — TELEPHONE (OUTPATIENT)
Dept: OBGYN CLINIC | Age: 24
End: 2024-12-02

## 2024-12-02 ENCOUNTER — HOSPITAL ENCOUNTER (EMERGENCY)
Age: 24
Discharge: HOME | End: 2024-12-02
Payer: COMMERCIAL

## 2024-12-02 VITALS
HEIGHT: 69 IN | HEART RATE: 77 BPM | BODY MASS INDEX: 27.25 KG/M2 | WEIGHT: 184 LBS | SYSTOLIC BLOOD PRESSURE: 119 MMHG | OXYGEN SATURATION: 99 % | TEMPERATURE: 97.8 F | DIASTOLIC BLOOD PRESSURE: 70 MMHG | RESPIRATION RATE: 14 BRPM

## 2024-12-02 DIAGNOSIS — N93.9 VAGINAL BLEEDING: Primary | ICD-10-CM

## 2024-12-02 LAB
ABO + RH BLD: NORMAL
ANION GAP SERPL CALCULATED.3IONS-SCNC: 9 MMOL/L (ref 3–16)
B-HCG SERPL EIA 3RD IS-ACNC: <5 MIU/ML
BACTERIA URNS QL MICRO: ABNORMAL /HPF
BILIRUB UR QL STRIP.AUTO: NEGATIVE
BUN SERPL-MCNC: 18 MG/DL (ref 7–20)
CALCIUM SERPL-MCNC: 9.2 MG/DL (ref 8.3–10.6)
CHLORIDE SERPL-SCNC: 106 MMOL/L (ref 99–110)
CLARITY UR: ABNORMAL
CO2 SERPL-SCNC: 25 MMOL/L (ref 21–32)
COLOR UR: YELLOW
CREAT SERPL-MCNC: 0.6 MG/DL (ref 0.6–1.1)
DEPRECATED RDW RBC AUTO: 13.7 % (ref 12.4–15.4)
EPI CELLS #/AREA URNS HPF: ABNORMAL /HPF (ref 0–5)
GFR SERPLBLD CREATININE-BSD FMLA CKD-EPI: >90 ML/MIN/{1.73_M2}
GLUCOSE SERPL-MCNC: 85 MG/DL (ref 70–99)
GLUCOSE UR STRIP.AUTO-MCNC: NEGATIVE MG/DL
HCT VFR BLD AUTO: 35.2 % (ref 36–48)
HGB BLD-MCNC: 12.2 G/DL (ref 12–16)
HGB UR QL STRIP.AUTO: ABNORMAL
INR PPP: 1 (ref 0.85–1.15)
KETONES UR STRIP.AUTO-MCNC: NEGATIVE MG/DL
LEUKOCYTE ESTERASE UR QL STRIP.AUTO: NEGATIVE
MCH RBC QN AUTO: 29 PG (ref 26–34)
MCHC RBC AUTO-ENTMCNC: 34.7 G/DL (ref 31–36)
MCV RBC AUTO: 83.5 FL (ref 80–100)
NITRITE UR QL STRIP.AUTO: NEGATIVE
PH UR STRIP.AUTO: 7 [PH] (ref 5–8)
PLATELET # BLD AUTO: 205 K/UL (ref 135–450)
PMV BLD AUTO: 8 FL (ref 5–10.5)
POTASSIUM SERPL-SCNC: 4.2 MMOL/L (ref 3.5–5.1)
PROT UR STRIP.AUTO-MCNC: NEGATIVE MG/DL
PROTHROMBIN TIME: 13.4 SEC (ref 11.9–14.9)
RBC # BLD AUTO: 4.21 M/UL (ref 4–5.2)
RBC #/AREA URNS HPF: ABNORMAL /HPF (ref 0–4)
SODIUM SERPL-SCNC: 140 MMOL/L (ref 136–145)
SP GR UR STRIP.AUTO: 1.02 (ref 1–1.03)
UA COMPLETE W REFLEX CULTURE PNL UR: ABNORMAL
UA DIPSTICK W REFLEX MICRO PNL UR: YES
URN SPEC COLLECT METH UR: ABNORMAL
UROBILINOGEN UR STRIP-ACNC: 0.2 E.U./DL
WBC # BLD AUTO: 4.2 K/UL (ref 4–11)
WBC #/AREA URNS HPF: ABNORMAL /HPF (ref 0–5)

## 2024-12-02 PROCEDURE — 80048 BASIC METABOLIC PNL TOTAL CA: CPT

## 2024-12-02 PROCEDURE — 99283 EMERGENCY DEPT VISIT LOW MDM: CPT

## 2024-12-02 PROCEDURE — 81001 URINALYSIS AUTO W/SCOPE: CPT

## 2024-12-02 PROCEDURE — 85027 COMPLETE CBC AUTOMATED: CPT

## 2024-12-02 PROCEDURE — 84702 CHORIONIC GONADOTROPIN TEST: CPT

## 2024-12-02 PROCEDURE — 86901 BLOOD TYPING SEROLOGIC RH(D): CPT

## 2024-12-02 PROCEDURE — 85610 PROTHROMBIN TIME: CPT

## 2024-12-02 PROCEDURE — 86900 BLOOD TYPING SEROLOGIC ABO: CPT

## 2024-12-02 ASSESSMENT — ENCOUNTER SYMPTOMS
CHEST TIGHTNESS: 0
COLOR CHANGE: 0
VOMITING: 0
NAUSEA: 0
SHORTNESS OF BREATH: 0

## 2024-12-02 ASSESSMENT — LIFESTYLE VARIABLES
HOW MANY STANDARD DRINKS CONTAINING ALCOHOL DO YOU HAVE ON A TYPICAL DAY: 1 OR 2
HOW OFTEN DO YOU HAVE A DRINK CONTAINING ALCOHOL: MONTHLY OR LESS

## 2024-12-02 ASSESSMENT — PAIN - FUNCTIONAL ASSESSMENT: PAIN_FUNCTIONAL_ASSESSMENT: NONE - DENIES PAIN

## 2024-12-02 NOTE — TELEPHONE ENCOUNTER
Pt called and scheduled her annual. She says she has just had her first period post baby and it is very heavy. She started bleeding on Saturday light. Reports she has saturated a pad an hour since this am. The heavy bleeding started last night. Pt reports she is also light headed. Pt advised to report to ED for evaluation. Please advise

## 2024-12-02 NOTE — ED TRIAGE NOTES
Patient states she is having her first period since having her baby, heavy bleeding. Bleeding through a pad every hour. Called her ob and was advised to come to er. Dizziness and weakness.

## 2024-12-02 NOTE — DISCHARGE INSTRUCTIONS
Your blood counts are stable.  No evidence of blood loss anemia.  Your heart rate and your blood pressure are normal as well as your oxygen levels are normal.  Pregnancy hormone levels are negative.    Follow-up with your primary care versus OB/GYN particularly if you have continued bleeding lasting greater than a week to recheck your blood counts.    Please return to the emergency department if you experience any persistent or worsening symptoms and are not able to follow-up with your primary/OB/GYN to recheck your blood counts particularly with increased bleeding, near-syncope, syncope.

## 2024-12-02 NOTE — ED PROVIDER NOTES
[facility] ED  EMERGENCY DEPARTMENT ENCOUNTER        Pt Name: Alyssa Causey  MRN: [id number]  Birthdate 2000  Date of evaluation: 12/2/2024  Provider: GRACE Delgado - ANDRES  PCP: Gilbert Ayon DO  Note Started: 6:10 PM EST 12/2/24      ADÁN. I have evaluated this patient.        CHIEF COMPLAINT       Chief Complaint   Patient presents with    Vaginal Bleeding     Patient states she is having her first period since having her baby, heavy bleeding. Bleeding through a pad every hour. Called her ob and was advised to come to er. Dizziness and weakness.        HISTORY OF PRESENT ILLNESS: 1 or more Elements     History From: Patient, EMR review    Chief Complaint: Dysmenorrhea    Alyssa Causey is a 24 y.o. female with a past medical history notable for anxiety, depression, eczema who presents to the emergency department for evaluation of dysmenorrhagia.  States that she had a spontaneous vaginal delivery in June 2024.  She has had her first return of menses beginning on 11/30/2024.  Initially bleeding started off as she anticipated.  Today has increased.  She called her OB/GYN who advised her to come to the emergency department.  States that she was feeling a little lightheaded and generally weak earlier.  She has no known bleeding or clotting disorders and does not take chronic anticoagulation therapy.  Denies abdominal pain.  Does not believe that there is a concern for pregnancy today.    Nursing Notes were all reviewed and agreed with or any disagreements were addressed in the HPI.    REVIEW OF SYSTEMS :      Review of Systems   Constitutional:  Negative for chills, fatigue and fever.   Respiratory:  Negative for chest tightness and shortness of breath.    Cardiovascular:  Negative for chest pain and palpitations.   Gastrointestinal:  Negative for nausea and vomiting.   Genitourinary:  Positive for menstrual problem.   Skin:  Negative for color change.   Neurological:

## 2025-01-13 ENCOUNTER — TELEPHONE (OUTPATIENT)
Dept: SURGERY | Age: 25
End: 2025-01-13

## 2025-01-13 NOTE — TELEPHONE ENCOUNTER
Pt called stating she had an I& D done in July and it has returned.  She states is ruptured last night and was foul smelling.  Scheduled pt to be seen tomorrow 1/14/25 and informed pt would send a msg to see if she should be started on abx.

## 2025-01-14 ENCOUNTER — PREP FOR PROCEDURE (OUTPATIENT)
Dept: SURGERY | Age: 25
End: 2025-01-14

## 2025-01-14 ENCOUNTER — TELEPHONE (OUTPATIENT)
Dept: SURGERY | Age: 25
End: 2025-01-14

## 2025-01-14 ENCOUNTER — OFFICE VISIT (OUTPATIENT)
Dept: SURGERY | Age: 25
End: 2025-01-14
Payer: COMMERCIAL

## 2025-01-14 VITALS
TEMPERATURE: 98.1 F | SYSTOLIC BLOOD PRESSURE: 118 MMHG | DIASTOLIC BLOOD PRESSURE: 66 MMHG | BODY MASS INDEX: 27.93 KG/M2 | WEIGHT: 188.6 LBS | HEIGHT: 69 IN

## 2025-01-14 DIAGNOSIS — L02.31 ABSCESS OF GLUTEAL CLEFT: Primary | ICD-10-CM

## 2025-01-14 PROCEDURE — 99213 OFFICE O/P EST LOW 20 MIN: CPT | Performed by: SURGERY

## 2025-01-14 RX ORDER — SODIUM CHLORIDE 0.9 % (FLUSH) 0.9 %
5-40 SYRINGE (ML) INJECTION EVERY 12 HOURS SCHEDULED
Status: CANCELLED | OUTPATIENT
Start: 2025-01-14

## 2025-01-14 RX ORDER — SODIUM CHLORIDE 9 MG/ML
INJECTION, SOLUTION INTRAVENOUS PRN
Status: CANCELLED | OUTPATIENT
Start: 2025-01-14

## 2025-01-14 RX ORDER — IBUPROFEN 800 MG/1
800 TABLET, FILM COATED ORAL EVERY 6 HOURS PRN
COMMUNITY

## 2025-01-14 RX ORDER — SODIUM CHLORIDE 0.9 % (FLUSH) 0.9 %
5-40 SYRINGE (ML) INJECTION PRN
Status: CANCELLED | OUTPATIENT
Start: 2025-01-14

## 2025-01-14 NOTE — TELEPHONE ENCOUNTER
Pt saw Dr Camacho in the office today 1/14/25 and was given surgery instructions for a Pilonidal Cyst Excision (General Anes)  scheduled Friday 1/17/25 @ 10am arrival 8am [facility] - NPO after midnight shivani b/f surgery - Pt will need  day of surgery - Dr Caro to complete pre-op physical - Pt understood and agreed w/ above noted

## 2025-01-15 NOTE — PATIENT INSTRUCTIONS
[facility]  Phone: [phone]  Fax: [phone]    You will be scheduled for surgery with Dr. Camacho.   The office will call you with the date and time that surgery is scheduled.  Please take note of these instructions for surgery:  You should have nothing by mouth after midnight the night before your surgery - this includes no food or water.   Your surgery will be cancelled if you have taken anything by mouth after midnight, NO exceptions.   You will need to have a history and physical prior to your surgery. This will need to be completed up to 30 days before your surgery. This H/P can be completed by your family doctor or the hospital.   IF you take coumadin (warfarin), please stop taking this medication 5 days prior to your surgery.   IF you take plavix, please stop taking this 7 days prior to your surgery.   Please contact our office if you have a pacemaker or defibrillator.  IF you are allergic to latex, please tell our office prior to your surgery. This is important in know before scheduling your surgery.  IF you are having an out patient surgery, you will need someone available to drive you home after your surgery, and to also stay with you for the rest of the day.   IF you are having a surgery requiring an inpatient stay in the hospital, you will need someone to drive you home upon discharge from the hospital.  Please contact Dr. Camacho's assistant Maria Alejandra if you have any questions or concerns.  Please call the office with any changes in your symptoms or further questions/concerns. [phone]

## 2025-01-15 NOTE — PROGRESS NOTES
Patient known to me, ~5-6 months s/p I&D of left paramedian gluteal abscess.  Was doing well until ~3-4 days ago when she noted recurrent pain and swelling of the same area.  Yesterday the site ruptured and drained foul-smelling pus.  Today is feeling somewhat better, only mildly sore.  Denies fever, chills.    Exam:  well-appearing, afebrile       Buttocks/kylie cleft - chronic abscess site w/ opening at mid-point left buttock just off the edge of the cleft, probed but no current pus found  Single midline sinus tract, currently sealed.    Impression:  likely old pilonidal cyst/sinus tract with chronic recurrent left sided abscess    Plan: she would benefit from excision of the chronic abscess site along with the midline sinus tract.  We discussed the technical aspects of the procedure, along with risks and complications including bleeding, infection, poor wound healing, and recurrent disease.  Patient indicates she understands, asks appropriate questions, and agrees with the plan.

## 2025-01-16 ENCOUNTER — TELEPHONE (OUTPATIENT)
Dept: SURGERY | Age: 25
End: 2025-01-16

## 2025-01-16 ENCOUNTER — OFFICE VISIT (OUTPATIENT)
Dept: PRIMARY CARE CLINIC | Age: 25
End: 2025-01-16

## 2025-01-16 VITALS
HEART RATE: 92 BPM | WEIGHT: 187.6 LBS | SYSTOLIC BLOOD PRESSURE: 110 MMHG | OXYGEN SATURATION: 98 % | BODY MASS INDEX: 27.7 KG/M2 | DIASTOLIC BLOOD PRESSURE: 60 MMHG

## 2025-01-16 DIAGNOSIS — Z82.49 FAMILY HISTORY OF DEEP VEIN THROMBOSIS: ICD-10-CM

## 2025-01-16 DIAGNOSIS — E03.9 HYPOTHYROIDISM, UNSPECIFIED TYPE: ICD-10-CM

## 2025-01-16 DIAGNOSIS — F33.1 MODERATE EPISODE OF RECURRENT MAJOR DEPRESSIVE DISORDER (HCC): ICD-10-CM

## 2025-01-16 DIAGNOSIS — E03.9 HYPOTHYROIDISM, UNSPECIFIED TYPE: Primary | ICD-10-CM

## 2025-01-16 LAB — TSH SERPL DL<=0.005 MIU/L-ACNC: 1.64 UIU/ML (ref 0.27–4.2)

## 2025-01-16 SDOH — ECONOMIC STABILITY: FOOD INSECURITY: WITHIN THE PAST 12 MONTHS, YOU WORRIED THAT YOUR FOOD WOULD RUN OUT BEFORE YOU GOT MONEY TO BUY MORE.: NEVER TRUE

## 2025-01-16 SDOH — ECONOMIC STABILITY: FOOD INSECURITY: WITHIN THE PAST 12 MONTHS, THE FOOD YOU BOUGHT JUST DIDN'T LAST AND YOU DIDN'T HAVE MONEY TO GET MORE.: NEVER TRUE

## 2025-01-16 ASSESSMENT — PATIENT HEALTH QUESTIONNAIRE - PHQ9
SUM OF ALL RESPONSES TO PHQ9 QUESTIONS 1 & 2: 1
3. TROUBLE FALLING OR STAYING ASLEEP: SEVERAL DAYS
SUM OF ALL RESPONSES TO PHQ QUESTIONS 1-9: 5
SUM OF ALL RESPONSES TO PHQ QUESTIONS 1-9: 5
10. IF YOU CHECKED OFF ANY PROBLEMS, HOW DIFFICULT HAVE THESE PROBLEMS MADE IT FOR YOU TO DO YOUR WORK, TAKE CARE OF THINGS AT HOME, OR GET ALONG WITH OTHER PEOPLE: SOMEWHAT DIFFICULT
2. FEELING DOWN, DEPRESSED OR HOPELESS: NOT AT ALL
9. THOUGHTS THAT YOU WOULD BE BETTER OFF DEAD, OR OF HURTING YOURSELF: NOT AT ALL
4. FEELING TIRED OR HAVING LITTLE ENERGY: SEVERAL DAYS
8. MOVING OR SPEAKING SO SLOWLY THAT OTHER PEOPLE COULD HAVE NOTICED. OR THE OPPOSITE, BEING SO FIGETY OR RESTLESS THAT YOU HAVE BEEN MOVING AROUND A LOT MORE THAN USUAL: SEVERAL DAYS
SUM OF ALL RESPONSES TO PHQ QUESTIONS 1-9: 5
7. TROUBLE CONCENTRATING ON THINGS, SUCH AS READING THE NEWSPAPER OR WATCHING TELEVISION: SEVERAL DAYS
SUM OF ALL RESPONSES TO PHQ QUESTIONS 1-9: 5
1. LITTLE INTEREST OR PLEASURE IN DOING THINGS: SEVERAL DAYS
6. FEELING BAD ABOUT YOURSELF - OR THAT YOU ARE A FAILURE OR HAVE LET YOURSELF OR YOUR FAMILY DOWN: NOT AT ALL
5. POOR APPETITE OR OVEREATING: NOT AT ALL

## 2025-01-16 NOTE — TELEPHONE ENCOUNTER
Called and spoke w/ pt and informed her surgery time tomorrow with Dr Camacho has been moved to 9:30am arrival 7:30am [facility] Pt was ok w/ time change and said she would be there

## 2025-01-16 NOTE — PROGRESS NOTES
molecular at heparin or into pneumatic compression devices depending on the postoperative mobility advised.    Known risk factors for perioperative complications:     1. Hypothyroidism, unspecified type     -Past history of hypothyroidism 7 years ago, took thyroxine for 2 years     -No past thyroid profile available for review     -Ordered TSH reflex to T4 today  - TSH reflex to FT4; Future    2. Moderate episode of recurrent major depressive disorder (HCC)     -Chronic, controlled, On goal     -PHQ-9 and YONG-7 scoring is 0 today     -Continue taking Zoloft and BuSpar and prescribed dosage    3. Family history of deep vein thrombosis      -History of DVT/pulmonary embolism in father and history of factor V Leiden deficiency and brother      -No personal history of DVT or pulmonary embolism      -No history of intake of any anticoagulants      -Factor V Leiden level ordered today  - FACTOR 5 LEIDEN; Future     As outlined above, measures were taken to assess risk, and decrease risk when possible.  Risks of surgery were discussed with patient, and benefits believed to outweigh risks.  Patient is making an informed decision to proceed with surgery with an understanding of any risk.  Before procedure is done thyroid profile and factor V Leiden level ordered given the patient's past history of hypothyroidism and family history of DVT/factor V Leiden deficiency if the thyroid profile is normal the patient will be cleared for surgery at that time.  Regardless of factor V level patient is a continued of perioperative DVT prophylaxis . Please follow all pre-op recommendations above.      Lata Avina MD, PGY-1  Family [facility] Community [facility] Res[facility] Program   [facility]

## 2025-01-17 ENCOUNTER — ANESTHESIA EVENT (OUTPATIENT)
Dept: OPERATING ROOM | Age: 25
End: 2025-01-17
Payer: COMMERCIAL

## 2025-01-17 ENCOUNTER — ANESTHESIA (OUTPATIENT)
Dept: OPERATING ROOM | Age: 25
End: 2025-01-17
Payer: COMMERCIAL

## 2025-01-17 ENCOUNTER — HOSPITAL ENCOUNTER (OUTPATIENT)
Age: 25
Setting detail: OUTPATIENT SURGERY
Discharge: HOME | End: 2025-01-17
Attending: SURGERY | Admitting: SURGERY
Payer: COMMERCIAL

## 2025-01-17 VITALS
TEMPERATURE: 97.8 F | SYSTOLIC BLOOD PRESSURE: 116 MMHG | HEIGHT: 69 IN | BODY MASS INDEX: 26.66 KG/M2 | HEART RATE: 70 BPM | RESPIRATION RATE: 14 BRPM | OXYGEN SATURATION: 100 % | DIASTOLIC BLOOD PRESSURE: 77 MMHG | WEIGHT: 180 LBS

## 2025-01-17 DIAGNOSIS — L05.91 INFECTED PILONIDAL CYST: ICD-10-CM

## 2025-01-17 DIAGNOSIS — G89.18 POST-OP PAIN: Primary | ICD-10-CM

## 2025-01-17 LAB — HCG UR QL: NEGATIVE

## 2025-01-17 PROCEDURE — 7100000000 HC PACU RECOVERY - FIRST 15 MIN: Performed by: SURGERY

## 2025-01-17 PROCEDURE — 3600000004 HC SURGERY LEVEL 4 BASE: Performed by: SURGERY

## 2025-01-17 PROCEDURE — 2580000003 HC RX 258: Performed by: SURGERY

## 2025-01-17 PROCEDURE — 3700000001 HC ADD 15 MINUTES (ANESTHESIA): Performed by: SURGERY

## 2025-01-17 PROCEDURE — 7100000001 HC PACU RECOVERY - ADDTL 15 MIN: Performed by: SURGERY

## 2025-01-17 PROCEDURE — 2500000003 HC RX 250 WO HCPCS: Performed by: SURGERY

## 2025-01-17 PROCEDURE — 84703 CHORIONIC GONADOTROPIN ASSAY: CPT

## 2025-01-17 PROCEDURE — 11771 EXC PILONIDAL CYST XTNSV: CPT | Performed by: SURGERY

## 2025-01-17 PROCEDURE — 6360000002 HC RX W HCPCS: Performed by: ANESTHESIOLOGY

## 2025-01-17 PROCEDURE — 3700000000 HC ANESTHESIA ATTENDED CARE: Performed by: SURGERY

## 2025-01-17 PROCEDURE — 2709999900 HC NON-CHARGEABLE SUPPLY: Performed by: SURGERY

## 2025-01-17 PROCEDURE — 6360000002 HC RX W HCPCS: Performed by: SURGERY

## 2025-01-17 PROCEDURE — 7100000010 HC PHASE II RECOVERY - FIRST 15 MIN: Performed by: SURGERY

## 2025-01-17 PROCEDURE — 6370000000 HC RX 637 (ALT 250 FOR IP): Performed by: ANESTHESIOLOGY

## 2025-01-17 PROCEDURE — 2500000003 HC RX 250 WO HCPCS: Performed by: NURSE ANESTHETIST, CERTIFIED REGISTERED

## 2025-01-17 PROCEDURE — 6360000002 HC RX W HCPCS: Performed by: NURSE ANESTHETIST, CERTIFIED REGISTERED

## 2025-01-17 PROCEDURE — 7100000011 HC PHASE II RECOVERY - ADDTL 15 MIN: Performed by: SURGERY

## 2025-01-17 PROCEDURE — 3600000014 HC SURGERY LEVEL 4 ADDTL 15MIN: Performed by: SURGERY

## 2025-01-17 PROCEDURE — 88304 TISSUE EXAM BY PATHOLOGIST: CPT

## 2025-01-17 RX ORDER — PHENYLEPHRINE HCL IN 0.9% NACL 1 MG/10 ML
SYRINGE (ML) INTRAVENOUS
Status: DISCONTINUED | OUTPATIENT
Start: 2025-01-17 | End: 2025-01-17 | Stop reason: SDUPTHER

## 2025-01-17 RX ORDER — SODIUM CHLORIDE, SODIUM LACTATE, POTASSIUM CHLORIDE, CALCIUM CHLORIDE 600; 310; 30; 20 MG/100ML; MG/100ML; MG/100ML; MG/100ML
INJECTION, SOLUTION INTRAVENOUS CONTINUOUS
Status: DISCONTINUED | OUTPATIENT
Start: 2025-01-17 | End: 2025-01-17 | Stop reason: HOSPADM

## 2025-01-17 RX ORDER — SODIUM CHLORIDE 0.9 % (FLUSH) 0.9 %
5-40 SYRINGE (ML) INJECTION PRN
Status: DISCONTINUED | OUTPATIENT
Start: 2025-01-17 | End: 2025-01-17 | Stop reason: HOSPADM

## 2025-01-17 RX ORDER — KETOROLAC TROMETHAMINE 30 MG/ML
INJECTION, SOLUTION INTRAMUSCULAR; INTRAVENOUS
Status: DISCONTINUED | OUTPATIENT
Start: 2025-01-17 | End: 2025-01-17 | Stop reason: SDUPTHER

## 2025-01-17 RX ORDER — SODIUM CHLORIDE 9 MG/ML
INJECTION, SOLUTION INTRAVENOUS PRN
Status: DISCONTINUED | OUTPATIENT
Start: 2025-01-17 | End: 2025-01-17 | Stop reason: HOSPADM

## 2025-01-17 RX ORDER — ONDANSETRON 2 MG/ML
4 INJECTION INTRAMUSCULAR; INTRAVENOUS ONCE
Status: DISCONTINUED | OUTPATIENT
Start: 2025-01-17 | End: 2025-01-17 | Stop reason: HOSPADM

## 2025-01-17 RX ORDER — BUPIVACAINE HYDROCHLORIDE 5 MG/ML
INJECTION, SOLUTION EPIDURAL; INTRACAUDAL PRN
Status: DISCONTINUED | OUTPATIENT
Start: 2025-01-17 | End: 2025-01-17 | Stop reason: ALTCHOICE

## 2025-01-17 RX ORDER — ONDANSETRON 2 MG/ML
INJECTION INTRAMUSCULAR; INTRAVENOUS
Status: DISCONTINUED | OUTPATIENT
Start: 2025-01-17 | End: 2025-01-17 | Stop reason: SDUPTHER

## 2025-01-17 RX ORDER — OXYCODONE AND ACETAMINOPHEN 5; 325 MG/1; MG/1
1 TABLET ORAL EVERY 4 HOURS PRN
Qty: 10 TABLET | Refills: 0 | Status: SHIPPED | OUTPATIENT
Start: 2025-01-17 | End: 2025-01-20

## 2025-01-17 RX ORDER — SODIUM CHLORIDE 0.9 % (FLUSH) 0.9 %
5-40 SYRINGE (ML) INJECTION EVERY 12 HOURS SCHEDULED
Status: DISCONTINUED | OUTPATIENT
Start: 2025-01-17 | End: 2025-01-17 | Stop reason: HOSPADM

## 2025-01-17 RX ORDER — MEPERIDINE HYDROCHLORIDE 50 MG/ML
12.5 INJECTION INTRAMUSCULAR; INTRAVENOUS; SUBCUTANEOUS EVERY 5 MIN PRN
Status: DISCONTINUED | OUTPATIENT
Start: 2025-01-17 | End: 2025-01-17 | Stop reason: HOSPADM

## 2025-01-17 RX ORDER — LIDOCAINE HYDROCHLORIDE 20 MG/ML
INJECTION, SOLUTION EPIDURAL; INFILTRATION; INTRACAUDAL; PERINEURAL
Status: DISCONTINUED | OUTPATIENT
Start: 2025-01-17 | End: 2025-01-17 | Stop reason: SDUPTHER

## 2025-01-17 RX ORDER — MIDAZOLAM HYDROCHLORIDE 1 MG/ML
2 INJECTION, SOLUTION INTRAMUSCULAR; INTRAVENOUS
Status: COMPLETED | OUTPATIENT
Start: 2025-01-17 | End: 2025-01-17

## 2025-01-17 RX ORDER — DEXAMETHASONE SODIUM PHOSPHATE 4 MG/ML
INJECTION, SOLUTION INTRA-ARTICULAR; INTRALESIONAL; INTRAMUSCULAR; INTRAVENOUS; SOFT TISSUE
Status: DISCONTINUED | OUTPATIENT
Start: 2025-01-17 | End: 2025-01-17 | Stop reason: SDUPTHER

## 2025-01-17 RX ORDER — OXYCODONE HYDROCHLORIDE 5 MG/1
5 TABLET ORAL PRN
Status: DISCONTINUED | OUTPATIENT
Start: 2025-01-17 | End: 2025-01-17 | Stop reason: HOSPADM

## 2025-01-17 RX ORDER — MAGNESIUM HYDROXIDE 1200 MG/15ML
LIQUID ORAL CONTINUOUS PRN
Status: COMPLETED | OUTPATIENT
Start: 2025-01-17 | End: 2025-01-17

## 2025-01-17 RX ORDER — MIDAZOLAM HYDROCHLORIDE 1 MG/ML
2 INJECTION, SOLUTION INTRAMUSCULAR; INTRAVENOUS
Status: DISCONTINUED | OUTPATIENT
Start: 2025-01-17 | End: 2025-01-17 | Stop reason: HOSPADM

## 2025-01-17 RX ORDER — OXYCODONE HYDROCHLORIDE 5 MG/1
10 TABLET ORAL PRN
Status: DISCONTINUED | OUTPATIENT
Start: 2025-01-17 | End: 2025-01-17 | Stop reason: HOSPADM

## 2025-01-17 RX ORDER — DIPHENHYDRAMINE HYDROCHLORIDE 50 MG/ML
6.25 INJECTION INTRAMUSCULAR; INTRAVENOUS
Status: DISCONTINUED | OUTPATIENT
Start: 2025-01-17 | End: 2025-01-17 | Stop reason: HOSPADM

## 2025-01-17 RX ORDER — PROPOFOL 10 MG/ML
INJECTION, EMULSION INTRAVENOUS
Status: DISCONTINUED | OUTPATIENT
Start: 2025-01-17 | End: 2025-01-17 | Stop reason: SDUPTHER

## 2025-01-17 RX ORDER — NALOXONE HYDROCHLORIDE 0.4 MG/ML
INJECTION, SOLUTION INTRAMUSCULAR; INTRAVENOUS; SUBCUTANEOUS PRN
Status: DISCONTINUED | OUTPATIENT
Start: 2025-01-17 | End: 2025-01-17 | Stop reason: HOSPADM

## 2025-01-17 RX ORDER — DIPHENHYDRAMINE HYDROCHLORIDE 50 MG/ML
INJECTION INTRAMUSCULAR; INTRAVENOUS
Status: DISCONTINUED | OUTPATIENT
Start: 2025-01-17 | End: 2025-01-17 | Stop reason: SDUPTHER

## 2025-01-17 RX ADMIN — DIPHENHYDRAMINE HYDROCHLORIDE 25 MG: 50 INJECTION, SOLUTION INTRAMUSCULAR; INTRAVENOUS at 10:47

## 2025-01-17 RX ADMIN — PROPOFOL 200 MG: 10 INJECTION, EMULSION INTRAVENOUS at 09:41

## 2025-01-17 RX ADMIN — OXYCODONE HYDROCHLORIDE 5 MG: 5 TABLET ORAL at 11:39

## 2025-01-17 RX ADMIN — Medication 200 MCG: at 09:53

## 2025-01-17 RX ADMIN — MIDAZOLAM 2 MG: 1 INJECTION INTRAMUSCULAR; INTRAVENOUS at 09:36

## 2025-01-17 RX ADMIN — SODIUM CHLORIDE: 9 INJECTION, SOLUTION INTRAVENOUS at 10:01

## 2025-01-17 RX ADMIN — KETOROLAC TROMETHAMINE 15 MG: 30 INJECTION, SOLUTION INTRAMUSCULAR; INTRAVENOUS at 10:16

## 2025-01-17 RX ADMIN — HYDROMORPHONE HYDROCHLORIDE 0.5 MG: 1 INJECTION, SOLUTION INTRAMUSCULAR; INTRAVENOUS; SUBCUTANEOUS at 11:15

## 2025-01-17 RX ADMIN — SUGAMMADEX 200 MG: 100 INJECTION, SOLUTION INTRAVENOUS at 10:16

## 2025-01-17 RX ADMIN — LIDOCAINE HYDROCHLORIDE 100 MG: 20 INJECTION, SOLUTION EPIDURAL; INFILTRATION; INTRACAUDAL; PERINEURAL at 09:41

## 2025-01-17 RX ADMIN — WATER 2000 MG: 1 INJECTION INTRAMUSCULAR; INTRAVENOUS; SUBCUTANEOUS at 09:50

## 2025-01-17 RX ADMIN — SUGAMMADEX 200 MG: 100 INJECTION, SOLUTION INTRAVENOUS at 10:27

## 2025-01-17 RX ADMIN — DEXAMETHASONE SODIUM PHOSPHATE 10 MG: 4 INJECTION, SOLUTION INTRAMUSCULAR; INTRAVENOUS at 09:57

## 2025-01-17 RX ADMIN — ONDANSETRON 4 MG: 2 INJECTION INTRAMUSCULAR; INTRAVENOUS at 10:16

## 2025-01-17 ASSESSMENT — PAIN DESCRIPTION - LOCATION
LOCATION: COCCYX
LOCATION: SACRUM

## 2025-01-17 ASSESSMENT — PAIN SCALES - GENERAL
PAINLEVEL_OUTOF10: 2
PAINLEVEL_OUTOF10: 3
PAINLEVEL_OUTOF10: 4
PAINLEVEL_OUTOF10: 0
PAINLEVEL_OUTOF10: 4
PAINLEVEL_OUTOF10: 4

## 2025-01-17 ASSESSMENT — PAIN - FUNCTIONAL ASSESSMENT: PAIN_FUNCTIONAL_ASSESSMENT: 0-10

## 2025-01-17 ASSESSMENT — PAIN DESCRIPTION - DESCRIPTORS
DESCRIPTORS: DISCOMFORT
DESCRIPTORS: DISCOMFORT

## 2025-01-17 ASSESSMENT — PAIN DESCRIPTION - ORIENTATION
ORIENTATION: OUTER
ORIENTATION: OUTER

## 2025-01-17 NOTE — BRIEF OP NOTE
Brief Postoperative Note      Patient: Alyssa Causey  YOB: 2000  MRN: [id number]    Date of Procedure: 1/17/2025    Pre-Op Diagnosis Codes:      * Infected pilonidal cyst [L05.91]    Post-Op Diagnosis: Same       Procedure(s):  PILONIDAL CYST ABD ABCESS EXCISION    Surgeon(s):  Bartolo Camacho MD    Assistant:  Surgical Assistant: Laura Moreno    Anesthesia: General    Estimated Blood Loss (mL): less than 50     Complications: None    Specimens:   ID Type Source Tests Collected by Time Destination   A : PILONIDAL CYST AND ABCESS Tissue Tissue SURGICAL PATHOLOGY Bartolo Camacho MD 1/17/2025 1008        Implants:  * No implants in log *      Drains: * No LDAs found *    Findings:  Infection Present At Time Of Surgery (PATOS) (choose all levels that have infection present):  - Superficial Infection (skin/subcutaneous) present as evidenced by abscess and phlegmon  Other Findings: chronic abscess cavity on the left para-gluteal cleft, with associated underlying pilonidal cyst cavity tracking to the single midline sinus opening      Wide excision of the abscess cavity and midline sinus tract, closure with a paramedian Karydakis flap    Job#: [id number]    Electronically signed by BARTOLO CAMACHO MD on 1/17/2025 at 5:43 PM

## 2025-01-17 NOTE — ANESTHESIA POSTPROCEDURE EVALUATION
Department of Anesthesiology  Postprocedure Note    Patient: Alyssa Causey  MRN: [id number]  YOB: 2000  Date of evaluation: 1/17/2025    Procedure Summary       Date: 01/17/25 Room / Location: [address]    Anesthesia Start: 0936 Anesthesia Stop: 1052    Procedure: PILONIDAL CYST ABD ABCESS EXCISION (Buttocks) Diagnosis:       Infected pilonidal cyst      (Infected pilonidal cyst [L05.91])    Surgeons: Bartolo Camacho MD Responsible Provider: Max Andrea MD    Anesthesia Type: general ASA Status: 2            Anesthesia Type: No value filed.    Leyla Phase I: Leyla Score: 10    Leyla Phase II:      Anesthesia Post Evaluation    Comments: Anes Post-op Note    Name:    Alyssa Causey  MRN:      [id number]    Patient Vitals in the past 12 hrs:  01/17/25 1200, BP:116/77  01/17/25 1145, BP:125/86  01/17/25 1139, Resp:12  01/17/25 1130, BP:123/75, Pulse:66, Resp:16, SpO2:100 %  01/17/25 1115, BP:114/65, Pulse:68, Resp:16, SpO2:100 %  01/17/25 1100, BP:112/65, Pulse:69, Resp:16, SpO2:100 %  01/17/25 1048, BP:116/62, Temp:97.4 °F (36.3 °C), Temp src:Oral, Pulse:86, Resp:16, SpO2:100 %  01/17/25 0755, BP:116/75, Temp:97.9 °F (36.6 °C), Temp src:Oral, Pulse:69, Resp:16, SpO2:99 %, Height:1.753 m (5' 9\"), Weight:81.6 kg (180 lb)     LABS:    CBC  Lab Results       Component                Value               Date/Time                  WBC                      4.2                 12/02/2024 03:02 PM        HGB                      12.2                12/02/2024 03:02 PM        HCT                      35.2 (L)            12/02/2024 03:02 PM        PLT                      205                 12/02/2024 03:02 PM   RENAL  Lab Results       Component                Value               Date/Time                  NA                       140                 12/02/2024 03:02 PM        K                        4.2                 12/02/2024 03:02 PM        CL

## 2025-01-17 NOTE — H&P
HISTORY & PHYSICAL FOR LOCAL CASES    Vitals:    01/17/25 0755   BP: 116/75   Pulse: 69   Resp: 16   Temp: 97.9 °F (36.6 °C)   SpO2: 99%         History of present illness:  recurrent pilonidal abscess on left buttock    All allergies & meds reviewed  RELEVANT EXAMS:  Airway:  normal    Pulmonary: normal    Cardiovascular: normal    Abdominal: normal    Specific to procedure: chronic abscess opening on mid-left paragluteal cleft w/ hard/indurated edges of the opening, no active purulent drainage currently    I have reviewed with the patient and/or family the risks, benefits and alternatives to the procedure.  ASA Class:  1    The patient condition is acceptable for planned local anesthesia:

## 2025-01-17 NOTE — RESULT ENCOUNTER NOTE
Please let the patient know that her thyroid report is normal and no further testing or treatment is required.

## 2025-01-17 NOTE — ANESTHESIA PRE PROCEDURE
Time of last solid consumption: 2130                        Date of last liquid consumption: 01/16/25                        Date of last solid food consumption: 01/16/25    BMI:   Wt Readings from Last 3 Encounters:   01/17/25 81.6 kg (180 lb)   01/16/25 85.1 kg (187 lb 9.6 oz)   01/14/25 85.5 kg (188 lb 9.6 oz)     Body mass index is 26.58 kg/m².    CBC:   Lab Results   Component Value Date/Time    WBC 4.2 12/02/2024 03:02 PM    RBC 4.21 12/02/2024 03:02 PM    HGB 12.2 12/02/2024 03:02 PM    HCT 35.2 12/02/2024 03:02 PM    MCV 83.5 12/02/2024 03:02 PM    RDW 13.7 12/02/2024 03:02 PM     12/02/2024 03:02 PM       CMP:   Lab Results   Component Value Date/Time     12/02/2024 03:02 PM    K 4.2 12/02/2024 03:02 PM     12/02/2024 03:02 PM    CO2 25 12/02/2024 03:02 PM    BUN 18 12/02/2024 03:02 PM    CREATININE 0.6 12/02/2024 03:02 PM    AGRATIO 1.3 05/30/2024 05:54 PM    LABGLOM >90 12/02/2024 03:02 PM    GLUCOSE 85 12/02/2024 03:02 PM    CALCIUM 9.2 12/02/2024 03:02 PM    BILITOT 0.6 05/30/2024 05:54 PM    ALKPHOS 101 05/30/2024 05:54 PM    AST 18 05/30/2024 05:54 PM    ALT 12 05/30/2024 05:54 PM       POC Tests: No results for input(s): \"POCGLU\", \"POCNA\", \"POCK\", \"POCCL\", \"POCBUN\", \"POCHEMO\", \"POCHCT\" in the last 72 hours.    Coags:   Lab Results   Component Value Date/Time    PROTIME 13.4 12/02/2024 03:02 PM    INR 1.00 12/02/2024 03:02 PM       HCG (If Applicable):   Lab Results   Component Value Date    PREGTESTUR Negative 01/17/2025    HCGQUANT <5.0 12/02/2024        ABGs: No results found for: \"PHART\", \"PO2ART\", \"ZON2HKK\", \"EEU8ABK\", \"BEART\", \"V5RVGPMY\"     Type & Screen (If Applicable):  Lab Results   Component Value Date    ABORH A POS 12/02/2024    LABANTI NEG 06/26/2024       Drug/Infectious Status (If Applicable):  Lab Results   Component Value Date/Time    HIV Non-Reactive 12/29/2023 10:04 AM       COVID-19 Screening (If Applicable): No results found for: \"COVID19\"        Anesthesia

## 2025-01-17 NOTE — DISCHARGE INSTRUCTIONS
[facility]    Bartolo Camacho M.D.   [facility] Office      [facility] Office        [facility]               [address]                [address]  John Bocanegra M.D.              [address]           [address]          [place] [address]         [place] [address]  Rahul Hernandez M.D                         [phone]        [facility]                   Wander Zarate M.D.          [facility]       POST-OPERATIVE INSTRUCTIONS FOLLOWING EXCISION OF A MASS    Call the office to schedule your post-operative appointment with your surgeon for two (2) weeks.     You will have external sutures and a bandage over your wound  You may remove the bandage in 2 days and take a shower, gently cleansing the area  Place clean dry bandage back over the wound.       You may shower after 2 days.  Wash incisions gently, and pat them dry. Do not rub your incisions.      You will have pain medicine ordered. Take as directed.    Do NOT drive for one week and while taking your narcotic pain medicine.     Watch for signs of infection:       Fever over 100.5°     Excessive warmth, or bright redness around your incision    Leakage of bloody or cloudy fluid from you incisions      ANESTHESIA DISCHARGE INSTRUCTIONS    You are under the influence of drugs- do not drink alcohol, drive a car, operate machinery(such as power tools, kitchen appliances, etc), sign legal documents, or make any important decisions for 24 hours (or while on pain medications).   Children should not ride bikes or skate boards or play on gym sets  for 24 hours after surgery.  A responsible adult should be with you for 24 hours.  Rest at home today- increase activity as tolerated.  Progress slowly to a regular diet unless your physician has instructed you otherwise. Drink plenty of water.    CALL YOUR DOCTOR IF YOU:  Have moderate to

## 2025-01-17 NOTE — PROGRESS NOTES
Date and time of surgery : 1/17/25 at 1000             Arrival Time: 0800      Bring Picture ID and insurance card.  Please wear simple, loose fitting clothing to the hospital.   Do not bring valuables (money, credit cards, checkbooks, etc.)   Do not wear any makeup (including  eye makeup) and no nail polish or artificial nails on your fingers or toes.  DO NOT wear any jewelry or piercings on day of surgery.  All body piercing jewelry must be removed.  If you have dentures, they will be removed before going to the OR; we will provide you a container.  If you wear contact lenses or glasses, they will be removed; please bring a case for them.  Shower the evening before or morning of surgery with antibacterial soap.  Nothing to eat or drink after midnight the day before surgery.   You may brush your teeth and gargle the morning of surgery.  DO NOT SWALLOW WATER.   The morning of your surgery take only Sertraline with a sip of water.  Aspirin, Ibuprofen, Advil, Naproxen, Vitamin E and other Anti-inflammatory products and supplements should be stopped for 5 -7days before surgery or as directed by your physician. Stop Ibuprofen now.  Do not smoke or drink any alcoholic beverages 24 hours prior to surgery.  This includes NA Beer. Refrain from the usage of any recreational drugs, including non-prescribed prescription drugs.   You MUST plan for a responsible adult to stay on site while you are here and take you home after your surgery. You will not be allowed to leave alone or drive yourself home. It is strongly suggested someone stay with you the first 24 hrs. Your surgery will be cancelled if you do not have a ride home.  To help prevent infection, change your sheets the night before surgery.   If you  have a Living Will and Durable Power of  for Healthcare, please bring in a copy.  Notify your Surgeon if you develop any illness between now and time of surgery. Cough, cold, fever, sore throat, nausea, vomiting, 
 here. States he did not get to talk to the surgeon after surgery. He and the patient have may questions that are not addressed in the instructions. Patient states she does not feel that her instructions are adequate for her to care for herself at home. I placed a call to Dr Camacho to talk to the  and he called back and answered all their questions. Patient states her pain level is down to a level 1-2 out of 10 now. Assisted to bathroom to urinate - voided large amount.   
Alyssa Padilla    Age 24 y.o.    female    2000    MRN [id number]    1/17/2025  Arrival Time_____________  OR Time____________75 Min     Procedure(s):  PILONIDAL CYST EXCISION                      General    Surgeon(s):  Camacho, Bartolo [place], MD       Phone [phone] (home)     Initals  Date  Info Source  Home  Cell         Work  _____________________________________________________________________  _____________________________________________________________________  _____________________________________________________________________  _____________________________________________________________________  _____________________________________________________________________    PCP _____________________________ Phone_________________     H&P  ________________  Bringing      Chart              Epic      DOS      Called________  EKG ________________   Bringing      Chart              Epic      DOS      Called________  LABS________________   Bringing     Chart              Epic      DOS      Called________  Cardiac Clearance ______ Bringing      Chart              Epic      DOS      Called________  Pulmonary Clearance____ Bringing      Chart              Epic      DOS      Called________    Cardiologist________________________ Phone___________________________  Pulmonologist_______________________Phone___________________________    ? Advance Directives   ? Oriental orthodox concerns / Waiver on Chart            PAT Communications________________  ? Pre-op Instructions Given /Understood          _________________________________  ? Directions to Surgery Center                          _________________________________  ? Transportation Home_______________      __________________________________  ? Crutches/Walker__________________        __________________________________    Orders: Hard copy/ EPIC                 Transcribed/ EPIC              _______Wt.    ________Pharmacy                         _______SCD           
Awake and alert with no complaints. VS stable. Discharge instructions reviewed with patient/responsible adult and understanding verbalized. Discharge instructions copies given. Discharge criteria met per hospital policy. Patient discharged home with belongings.   
Awake and alert. VS stable. Pt met phase one discharge criteria per hospital policy  
Instructions reviewed with the patient - she wants to know when she can take the dressing off since the instructions only address that if a waterproof covering on and she does not have that. Dr Camacho's office called for clarification.   
Oral airway removed without difficulty.  
Patient admitted to pre-op room 12 in preparation for surgery, VSS. Consent confirmed. IV inserted into left AC, LR infusing. Belongings on cart. NPO since 2130. Family at bedside, phone number in system for text updates, call light within reach.    
Received in PACU with oral airway in place. . Report received from OR with  nurse and CRNA. Starting to awaken.   
none

## 2025-01-18 NOTE — OP NOTE
[address] [address]                            OPERATIVE REPORT      PATIENT NAME: TERRIE GODINEZ             : 2000  MED REC NO: [id number]                      ROOM: [facility]  ACCOUNT NO: [id number]                       ADMIT DATE: 2025  PROVIDER: Bartolo Camacho MD      DATE OF PROCEDURE:  2025    SURGEON:  Bartolo Camacho MD    PREOPERATIVE DIAGNOSIS:  Pilonidal cyst with abscess.    POSTOPERATIVE DIAGNOSIS:  Pilonidal cyst with abscess.    PROCEDURE PERFORMED:  Excision of pilonidal cyst and abscess with Karydakis flap closure.    ANESTHESIA:  General.    ESTIMATED BLOOD LOSS:  Less than 50 mL.    SPECIMEN:  Gluteal cleft skin with underlying soft tissue including pilonidal cyst and abscess.    COUNTS:  Sponge and needle counts correct.    INDICATIONS:  The patient is a 24-year-old female who has had a chronic draining abscess on her left buttock just off the kylie cleft, consistent with a pilonidal abscess.  She has a single small chronic sinus opening in the midportion of her  cleft consistent with a pilonidal sinus.  She presents for elective excision of the chronic abscess as well as the likely excision of the underlying pilonidal cyst.    FINDINGS:    1. Superficial infection present at the skin surface with chronic abscess cavity and phlegmon.  2. Associated underlying pilonidal cyst cavity tracking down to the single midline sinus opening.  3. Wide excision of the abscess cavity and the midline sinus tract, with closure using a paramedian Karydakis flap.    DESCRIPTION OF PROCEDURE:  After informed consent was obtained, the patient was taken to the operating room.  She was placed under general anesthesia and rotated on the operating table in the prone position with appropriate padding to all pressure points.  The buttock and kylie cleft region were prepped and draped in a sterile

## 2025-01-20 ENCOUNTER — TELEPHONE (OUTPATIENT)
Dept: PRIMARY CARE CLINIC | Age: 25
End: 2025-01-20

## 2025-01-20 NOTE — TELEPHONE ENCOUNTER
Patient is calling the office and and got a phone call from this office and that they needed the patient to call back but she doesn't not know what the message is regarding .     Please advise   Patient name : francisco lindsey   Patient number :[phone]

## 2025-01-22 ENCOUNTER — TELEPHONE (OUTPATIENT)
Dept: SURGERY | Age: 25
End: 2025-01-22

## 2025-01-22 LAB
F5 P.R506Q BLD/T QL: NEGATIVE
SPECIMEN SOURCE: NORMAL

## 2025-01-22 NOTE — TELEPHONE ENCOUNTER
Pt calling to see if Dr Camacho is able to send something to employer showing her work restrictions and an extension until she is seen in office on 1/31/25. Please fax to the [employer] at [phone].

## 2025-01-23 NOTE — RESULT ENCOUNTER NOTE
Please inform the patient that her factor V Leiden testing is negative which means she is not at risk of clotting disorder due to factor V Leiden deficiency.

## 2025-01-27 ENCOUNTER — TELEPHONE (OUTPATIENT)
Dept: SURGERY | Age: 25
End: 2025-01-27

## 2025-01-27 NOTE — TELEPHONE ENCOUNTER
Pt called stating her employer has her returning to work this Friday 1/31/25 but that is her 1st 2-week post op date w/ Dr Camacho - I explained to pt that I sent her FMLA paperwork to [employer] on 1/20/25 and documented that she is to be off through 1/31/25 and that we can addend her FMLA form after her post op visit eval with Dr Camacho this Friday - Pt asked for her FMLA form to be sent to her just so she has something to fall back on in case her employer disputes her RTW date - I emailed completed/signed FMLA form to pt today: [email or web address] (FMLA form scanned into media)

## 2025-01-31 ENCOUNTER — OFFICE VISIT (OUTPATIENT)
Dept: SURGERY | Age: 25
End: 2025-01-31

## 2025-01-31 VITALS
WEIGHT: 180 LBS | DIASTOLIC BLOOD PRESSURE: 66 MMHG | SYSTOLIC BLOOD PRESSURE: 118 MMHG | HEIGHT: 69 IN | BODY MASS INDEX: 26.66 KG/M2

## 2025-01-31 DIAGNOSIS — Z09 POSTOP CHECK: Primary | ICD-10-CM

## 2025-01-31 PROCEDURE — 99024 POSTOP FOLLOW-UP VISIT: CPT | Performed by: SURGERY

## 2025-01-31 RX ORDER — VENLAFAXINE 37.5 MG/1
37.5 TABLET ORAL 3 TIMES DAILY
COMMUNITY

## 2025-01-31 NOTE — PATIENT INSTRUCTIONS
Continue with routine wound care as discussed  Gradually increase activities as tolerated  Follow-up in 1 week or as needed; please call with questions or concerns

## 2025-01-31 NOTE — PROGRESS NOTES
Surgery Post-op Progress Note    HPI:  Notes reviewed, and agree with documentation in pt's chart.   Postoperative Follow-up: Patient presents for 2 week follow-up status post pilonidal cyst excision with Karydakis eccentric flap closure . Eating a regular diet without difficulty. Bowel movements are Normal.  Pain is controlled without any medications..     ROS:    10 point review of systems performed; please refer to HPI with pertinent positives, all other ROS are negative    A review of the patient's record including allergies, medication list, tobacco history, family history, problem list, medical history and social history has been completed and updates made to the patient's EMR where indicated.     PE:   CONSTITUTIONAL:  awake and alert    INCISION: clean, dry, no drainage, healing, central aspect of incision with ~2mm superficial skin separation with sutures still in place      ASSESSMENT:   Diagnosis Orders   1. Postop check              PLAN:    Removed the sutures from the top and bottom of the incision; left the central sutures in place around the area of separation  Continue with routine wound care as discussed  Gradually increase activities as tolerated  Follow-up in one week or as needed; please call with questions or concerns

## 2025-02-07 ENCOUNTER — OFFICE VISIT (OUTPATIENT)
Dept: SURGERY | Age: 25
End: 2025-02-07

## 2025-02-07 VITALS
DIASTOLIC BLOOD PRESSURE: 68 MMHG | BODY MASS INDEX: 27.99 KG/M2 | HEIGHT: 69 IN | WEIGHT: 189 LBS | SYSTOLIC BLOOD PRESSURE: 118 MMHG

## 2025-02-07 DIAGNOSIS — Z09 POSTOP CHECK: Primary | ICD-10-CM

## 2025-02-07 PROCEDURE — 99024 POSTOP FOLLOW-UP VISIT: CPT | Performed by: SURGERY

## 2025-02-07 NOTE — PROGRESS NOTES
Surgery Post-op Progress Note    HPI:  Notes reviewed, and agree with documentation in pt's chart.   Postoperative Follow-up: Patient presents for 3 week follow-up status post pilonidal cystectomy; partial removal of sutures done last week, left the central sutures in place due to superficial skin separation centrally. The patient is not having any pain..     ROS:    10 point review of systems performed; please refer to HPI with pertinent positives, all other ROS are negative    A review of the patient's record including allergies, medication list, tobacco history, family history, problem list, medical history and social history has been completed and updates made to the patient's EMR where indicated.     PE:   CONSTITUTIONAL:  awake and alert     CLEFT:      INCISION: central portion of wound w/ three remaining sutures, very superficial separation of skin here, ~2mm wide, and only 2mm deep with visible underlying granulation suture      ASSESSMENT:   Diagnosis Orders   1. Postop check              PLAN:    Remaining sutures removed without difficulty  Continue with routine wound care as discussed  Gradually increase activities as tolerated  Follow-up as needed; please call with questions or concerns

## 2025-04-24 ENCOUNTER — OFFICE VISIT (OUTPATIENT)
Dept: PRIMARY CARE CLINIC | Age: 25
End: 2025-04-24

## 2025-04-24 VITALS
WEIGHT: 185 LBS | BODY MASS INDEX: 27.31 KG/M2 | DIASTOLIC BLOOD PRESSURE: 66 MMHG | HEART RATE: 70 BPM | SYSTOLIC BLOOD PRESSURE: 118 MMHG

## 2025-04-24 DIAGNOSIS — F33.1 MODERATE EPISODE OF RECURRENT MAJOR DEPRESSIVE DISORDER (HCC): ICD-10-CM

## 2025-04-24 DIAGNOSIS — L30.9 ECZEMA, UNSPECIFIED TYPE: Primary | Chronic | ICD-10-CM

## 2025-04-24 RX ORDER — ALCLOMETASONE DIPROPIONATE 0.5 MG/G
CREAM TOPICAL
Qty: 15 G | Refills: 0 | Status: CANCELLED | OUTPATIENT
Start: 2025-04-24

## 2025-04-24 RX ORDER — BETAMETHASONE DIPROPIONATE 0.5 MG/G
CREAM TOPICAL
Qty: 45 G | Refills: 0 | Status: SHIPPED | OUTPATIENT
Start: 2025-04-24 | End: 2025-05-08

## 2025-04-24 RX ORDER — VENLAFAXINE HYDROCHLORIDE 150 MG/1
150 CAPSULE, EXTENDED RELEASE ORAL DAILY
COMMUNITY
Start: 2025-04-15

## 2025-04-24 ASSESSMENT — PATIENT HEALTH QUESTIONNAIRE - PHQ9
2. FEELING DOWN, DEPRESSED OR HOPELESS: SEVERAL DAYS
SUM OF ALL RESPONSES TO PHQ QUESTIONS 1-9: 1
1. LITTLE INTEREST OR PLEASURE IN DOING THINGS: NOT AT ALL
SUM OF ALL RESPONSES TO PHQ QUESTIONS 1-9: 1

## 2025-04-24 NOTE — PATIENT INSTRUCTIONS
smooth to prevent damage to the skin when you scratch it. Wearing cotton mittens or gloves can help you stop scratching.  Try to avoid things that trigger your rash. These may include things like allergens, such as pollen or animal dander. Harsh soaps, scratchy clothes, and stress are other examples.  When should you call for help?   Call your doctor now or seek immediate medical care if:    Your rash gets worse and you have a fever.     You have new blisters, or the rash spreads and looks like a sunburn.     You have signs of infection, such as:  Increased pain, swelling, warmth, or redness.  Red streaks leading from the rash.  Pus draining from the rash.  A fever.     You have crusting or oozing sores.     You have joint aches or body aches along with your rash.   Watch closely for changes in your health, and be sure to contact your doctor if:    Your rash does not clear up after 2 to 3 weeks of home treatment.     Itching interferes with your sleep, daily activities, or mood.   Where can you learn more?  Go to https://www.[employer].net/patientEd and enter I585 to learn more about \"Eczema: Care Instructions.\"  Current as of: December 4, 2024  Content Version: 14.4  © 0895-1316 [employer].   Care instructions adapted under license by [employer]. If you have questions about a medical condition or this instruction, always ask your healthcare professional. [employer], disclaims any warranty or liability for your use of this information.

## 2025-04-24 NOTE — PROGRESS NOTES
PROGRESS NOTE   [facility]                                  [address], [address], [place] [address]         Phone: [phone]    Date of Service:  4/24/2025     Patient ID: .Alyssa Causey is a 24 y.o. female      Subjective:     CC: Rash    HPI  Past medical history of anxiety, depression, hx of eczema    Rash  Patient is no longer breast-feeding.  Patient states that she has rash on the back of her hands her elbows.  Patient rash started about 1 week ago usually occurs around this time around springtime.  Patient has been using Benadryl for the itchiness and hyaluronic acid.  Patient is not using steroid cream at this time but used in the past which helped her itchiness.  Patient states it is getting worse not sick, no sick contacts, no travel history, no self changes, pets at home but constant.  Patient denies any chest pain, shortness of breath, fevers, chills, runny nose.  Patient used to take Kenalog.    Major depressive disorder (HCC)  PHQ-9 Total Score: 1 (4/24/2025  1:43 PM)  Patient states that she is doing well.  Patient is on the Effexor  mg and tolerating without any difficulties.  Patient has a therapist and a psychiatrist.  Patient denies any self-harm or suicide.    ROS: Negative otherwise known HPI        Vitals:    04/24/25 1331   BP: 118/66   BP Site: Left Upper Arm   Patient Position: Sitting   BP Cuff Size: Medium Adult   Pulse: 70   Weight: 83.9 kg (185 lb)       Allergies:  Patient has no known allergies.    Outpatient Medications Marked as Taking for the 4/24/25 encounter (Office Visit) with Mel Muro DO   Medication Sig Dispense Refill    venlafaxine (EFFEXOR XR) 150 MG extended release capsule Take 1 capsule by mouth daily      betamethasone dipropionate 0.05 % cream Apply topically 2 times daily. 45 g 0    ibuprofen (ADVIL;MOTRIN) 800 MG tablet Take 1 tablet by mouth every 6 hours as needed for Pain

## 2025-05-28 PROBLEM — Z84.2 FAMILY HISTORY OF ENDOMETRIOSIS: Status: ACTIVE | Noted: 2025-05-28

## 2025-05-28 PROBLEM — Z87.42 HX OF ABNORMAL CERVICAL PAP SMEAR: Status: ACTIVE | Noted: 2025-05-28

## 2025-05-28 PROBLEM — Z80.52 FAMILY HISTORY OF BLADDER CANCER: Status: ACTIVE | Noted: 2025-05-28

## 2025-06-02 ENCOUNTER — RESULTS FOLLOW-UP (OUTPATIENT)
Dept: OBGYN CLINIC | Age: 25
End: 2025-06-02

## 2025-06-10 ENCOUNTER — APPOINTMENT (OUTPATIENT)
Dept: GENERAL RADIOLOGY | Age: 25
End: 2025-06-10
Payer: COMMERCIAL

## 2025-06-10 ENCOUNTER — APPOINTMENT (OUTPATIENT)
Dept: ULTRASOUND IMAGING | Age: 25
End: 2025-06-10
Payer: COMMERCIAL

## 2025-06-10 ENCOUNTER — OFFICE VISIT (OUTPATIENT)
Dept: PRIMARY CARE CLINIC | Age: 25
End: 2025-06-10

## 2025-06-10 ENCOUNTER — HOSPITAL ENCOUNTER (EMERGENCY)
Age: 25
Discharge: HOME | End: 2025-06-10
Payer: COMMERCIAL

## 2025-06-10 VITALS
BODY MASS INDEX: 27.73 KG/M2 | RESPIRATION RATE: 14 BRPM | OXYGEN SATURATION: 98 % | TEMPERATURE: 100.2 F | WEIGHT: 187.2 LBS | HEART RATE: 113 BPM | DIASTOLIC BLOOD PRESSURE: 72 MMHG | HEIGHT: 69 IN | SYSTOLIC BLOOD PRESSURE: 120 MMHG

## 2025-06-10 VITALS
HEIGHT: 69 IN | RESPIRATION RATE: 18 BRPM | DIASTOLIC BLOOD PRESSURE: 83 MMHG | HEART RATE: 93 BPM | BODY MASS INDEX: 27.7 KG/M2 | WEIGHT: 187 LBS | SYSTOLIC BLOOD PRESSURE: 122 MMHG | OXYGEN SATURATION: 100 % | TEMPERATURE: 99.3 F

## 2025-06-10 DIAGNOSIS — R74.01 TRANSAMINITIS: ICD-10-CM

## 2025-06-10 DIAGNOSIS — R11.0 NAUSEA: ICD-10-CM

## 2025-06-10 DIAGNOSIS — N76.0 BACTERIAL VAGINOSIS: ICD-10-CM

## 2025-06-10 DIAGNOSIS — R68.89 FLU-LIKE SYMPTOMS: Primary | ICD-10-CM

## 2025-06-10 DIAGNOSIS — R50.9 FEVER, UNSPECIFIED FEVER CAUSE: Primary | ICD-10-CM

## 2025-06-10 DIAGNOSIS — B96.89 BACTERIAL VAGINOSIS: ICD-10-CM

## 2025-06-10 DIAGNOSIS — R52 BODY ACHES: ICD-10-CM

## 2025-06-10 LAB
ALBUMIN SERPL-MCNC: 4.3 G/DL (ref 3.4–5)
ALBUMIN/GLOB SERPL: 1.5 {RATIO} (ref 1.1–2.2)
ALP SERPL-CCNC: 107 U/L (ref 40–129)
ALT SERPL-CCNC: 163 U/L (ref 10–40)
ANION GAP SERPL CALCULATED.3IONS-SCNC: 14 MMOL/L (ref 3–16)
AST SERPL-CCNC: 146 U/L (ref 15–37)
BACTERIA GENITAL QL WET PREP: ABNORMAL
BACTERIA URNS QL MICRO: ABNORMAL /HPF
BASOPHILS # BLD: 0 K/UL (ref 0–0.2)
BASOPHILS NFR BLD: 0.3 %
BILIRUB SERPL-MCNC: 1.1 MG/DL (ref 0–1)
BILIRUB UR QL STRIP.AUTO: NEGATIVE
BUN SERPL-MCNC: 10 MG/DL (ref 7–20)
CALCIUM SERPL-MCNC: 8.8 MG/DL (ref 8.3–10.6)
CHLORIDE SERPL-SCNC: 100 MMOL/L (ref 99–110)
CLARITY UR: CLEAR
CLUE CELLS SPEC QL WET PREP: ABNORMAL
CO2 SERPL-SCNC: 20 MMOL/L (ref 21–32)
COLOR UR: YELLOW
CREAT SERPL-MCNC: 0.8 MG/DL (ref 0.6–1.1)
DEPRECATED RDW RBC AUTO: 13.2 % (ref 12.4–15.4)
EOSINOPHIL # BLD: 0 K/UL (ref 0–0.6)
EOSINOPHIL NFR BLD: 0.1 %
EPI CELLS #/AREA URNS HPF: ABNORMAL /HPF (ref 0–5)
EPI CELLS SPEC QL WET PREP: ABNORMAL
EXP DATE SOLUTION: NORMAL
EXP DATE SWAB: NORMAL
EXPIRATION DATE: NORMAL
GFR SERPLBLD CREATININE-BSD FMLA CKD-EPI: >90 ML/MIN/{1.73_M2}
GLUCOSE SERPL-MCNC: 105 MG/DL (ref 70–99)
GLUCOSE UR STRIP.AUTO-MCNC: NEGATIVE MG/DL
HCG SERPL QL: NEGATIVE
HCT VFR BLD AUTO: 37.5 % (ref 36–48)
HETEROPH AB BLD QL IA: NEGATIVE
HGB BLD-MCNC: 13.4 G/DL (ref 12–16)
HGB UR QL STRIP.AUTO: NEGATIVE
INFLUENZA A RNA, POC: NORMAL
INFLUENZA B RNA, POC: NORMAL
KETONES UR STRIP.AUTO-MCNC: NEGATIVE MG/DL
LACTATE BLDV-SCNC: 0.7 MMOL/L (ref 0.4–2)
LEUKOCYTE ESTERASE UR QL STRIP.AUTO: ABNORMAL
LOT NUMBER POC: NORMAL
LOT NUMBER SOLUTION: NORMAL
LOT NUMBER SWAB: NORMAL
LYMPHOCYTES # BLD: 0.6 K/UL (ref 1–5.1)
LYMPHOCYTES NFR BLD: 11.5 %
MAGNESIUM SERPL-MCNC: 2.14 MG/DL (ref 1.8–2.4)
MCH RBC QN AUTO: 29.5 PG (ref 26–34)
MCHC RBC AUTO-ENTMCNC: 35.8 G/DL (ref 31–36)
MCV RBC AUTO: 82.3 FL (ref 80–100)
MONOCYTES # BLD: 0.6 K/UL (ref 0–1.3)
MONOCYTES NFR BLD: 11.8 %
NEUTROPHILS # BLD: 4.1 K/UL (ref 1.7–7.7)
NEUTROPHILS NFR BLD: 76.3 %
NITRITE UR QL STRIP.AUTO: NEGATIVE
PH UR STRIP.AUTO: 6.5 [PH] (ref 5–8)
PLATELET # BLD AUTO: 188 K/UL (ref 135–450)
PMV BLD AUTO: 8.1 FL (ref 5–10.5)
POTASSIUM SERPL-SCNC: 3.4 MMOL/L (ref 3.5–5.1)
PROT SERPL-MCNC: 7.1 G/DL (ref 6.4–8.2)
PROT UR STRIP.AUTO-MCNC: NEGATIVE MG/DL
RBC # BLD AUTO: 4.55 M/UL (ref 4–5.2)
RBC #/AREA URNS HPF: ABNORMAL /HPF (ref 0–4)
RBC SPEC QL WET PREP: ABNORMAL
REASON FOR REJECTION: NORMAL
REJECTED TEST: NORMAL
SARS-COV-2 RNA, POC: NEGATIVE
SODIUM SERPL-SCNC: 134 MMOL/L (ref 136–145)
SP GR UR STRIP.AUTO: <=1.005 (ref 1–1.03)
SPECIMEN SOURCE FLD: ABNORMAL
T VAGINALIS GENITAL QL WET PREP: ABNORMAL
UA COMPLETE W REFLEX CULTURE PNL UR: ABNORMAL
UA DIPSTICK W REFLEX MICRO PNL UR: YES
URN SPEC COLLECT METH UR: ABNORMAL
UROBILINOGEN UR STRIP-ACNC: 1 E.U./DL
VALID INTERNAL CONTROL: NORMAL
WBC # BLD AUTO: 5.3 K/UL (ref 4–11)
WBC #/AREA URNS HPF: ABNORMAL /HPF (ref 0–5)
WBC SPEC QL WET PREP: ABNORMAL
YEAST GENITAL QL WET PREP: ABNORMAL

## 2025-06-10 PROCEDURE — 96376 TX/PRO/DX INJ SAME DRUG ADON: CPT

## 2025-06-10 PROCEDURE — 83605 ASSAY OF LACTIC ACID: CPT

## 2025-06-10 PROCEDURE — 6360000002 HC RX W HCPCS: Performed by: PHYSICIAN ASSISTANT

## 2025-06-10 PROCEDURE — 71046 X-RAY EXAM CHEST 2 VIEWS: CPT

## 2025-06-10 PROCEDURE — 83735 ASSAY OF MAGNESIUM: CPT

## 2025-06-10 PROCEDURE — 96374 THER/PROPH/DIAG INJ IV PUSH: CPT

## 2025-06-10 PROCEDURE — 80053 COMPREHEN METABOLIC PANEL: CPT

## 2025-06-10 PROCEDURE — 99284 EMERGENCY DEPT VISIT MOD MDM: CPT

## 2025-06-10 PROCEDURE — 2580000003 HC RX 258: Performed by: PHYSICIAN ASSISTANT

## 2025-06-10 PROCEDURE — 80074 ACUTE HEPATITIS PANEL: CPT

## 2025-06-10 PROCEDURE — 36415 COLL VENOUS BLD VENIPUNCTURE: CPT

## 2025-06-10 PROCEDURE — 87210 SMEAR WET MOUNT SALINE/INK: CPT

## 2025-06-10 PROCEDURE — 84703 CHORIONIC GONADOTROPIN ASSAY: CPT

## 2025-06-10 PROCEDURE — 96361 HYDRATE IV INFUSION ADD-ON: CPT

## 2025-06-10 PROCEDURE — 86308 HETEROPHILE ANTIBODY SCREEN: CPT

## 2025-06-10 PROCEDURE — 81001 URINALYSIS AUTO W/SCOPE: CPT

## 2025-06-10 PROCEDURE — 85025 COMPLETE CBC W/AUTO DIFF WBC: CPT

## 2025-06-10 PROCEDURE — 76705 ECHO EXAM OF ABDOMEN: CPT

## 2025-06-10 RX ORDER — 0.9 % SODIUM CHLORIDE 0.9 %
1000 INTRAVENOUS SOLUTION INTRAVENOUS ONCE
Status: COMPLETED | OUTPATIENT
Start: 2025-06-10 | End: 2025-06-10

## 2025-06-10 RX ORDER — METRONIDAZOLE 500 MG/1
500 TABLET ORAL 2 TIMES DAILY
Qty: 14 TABLET | Refills: 0 | Status: SHIPPED | OUTPATIENT
Start: 2025-06-10 | End: 2025-06-17

## 2025-06-10 RX ORDER — KETOROLAC TROMETHAMINE 30 MG/ML
15 INJECTION, SOLUTION INTRAMUSCULAR; INTRAVENOUS ONCE
Status: COMPLETED | OUTPATIENT
Start: 2025-06-10 | End: 2025-06-10

## 2025-06-10 RX ORDER — ONDANSETRON 4 MG/1
4 TABLET, FILM COATED ORAL 3 TIMES DAILY PRN
Qty: 15 TABLET | Refills: 0 | Status: ON HOLD | OUTPATIENT
Start: 2025-06-10 | End: 2025-06-14

## 2025-06-10 RX ADMIN — KETOROLAC TROMETHAMINE 15 MG: 30 INJECTION, SOLUTION INTRAMUSCULAR at 16:40

## 2025-06-10 RX ADMIN — KETOROLAC TROMETHAMINE 15 MG: 30 INJECTION, SOLUTION INTRAMUSCULAR at 18:59

## 2025-06-10 RX ADMIN — SODIUM CHLORIDE 1000 ML: 9 INJECTION, SOLUTION INTRAVENOUS at 16:41

## 2025-06-10 ASSESSMENT — PAIN DESCRIPTION - LOCATION: LOCATION: HEAD;GENERALIZED

## 2025-06-10 ASSESSMENT — PAIN - FUNCTIONAL ASSESSMENT: PAIN_FUNCTIONAL_ASSESSMENT: 0-10

## 2025-06-10 ASSESSMENT — PAIN SCALES - GENERAL
PAINLEVEL_OUTOF10: 5
PAINLEVEL_OUTOF10: 7

## 2025-06-10 ASSESSMENT — PAIN DESCRIPTION - DESCRIPTORS: DESCRIPTORS: ACHING

## 2025-06-10 NOTE — ED PROVIDER NOTES
[facility] EMERGENCY DEPARTMENT  EMERGENCY DEPARTMENT ENCOUNTER        Pt Name: Alyssa Causey  MRN: [id number]  Birthdate 2000  Date of evaluation: 6/10/2025  Provider: MOE HERRON PA-C  PCP: Gilbert Ayon DO  ED Attending: MD Ryan      ADÁN. I have evaluated this patient.      CHIEF COMPLAINT:     Chief Complaint   Patient presents with    Fever     Fever and generalized body aches, pt's temp 98.8 in triage.        HISTORY OF PRESENT ILLNESS:      History provided by the patient. No limitations.    Alyssa Causey is a 24 y.o. female who arrives to the ED by private vehicle.  Patient states she has been sick for the last few days.  She has not been around anyone else known to be ill.  She states she has had intermittent fevers, she has felt achy, has had a headache, nausea and overall diminished appetite.  She denies any earache or sore throat.  She denies any chest pain or shortness of breath.  She has had a mild cough.  She denies abdominal pain, vomiting or diarrhea.  She has some mild urinary urgency and mild vaginal discharge.  She was reportedly seen by her PCP today and in the office was checked for COVID and flu, both were negative.  The patient was told to go to the ED if her temperature denise above 103.5 °F.  The patient had not taken anything for fever since about 5 AM and around 1 PM she felt febrile, checked her temperature and it was 104 °F.  She took ibuprofen 400 mg orally.  By the time she arrives to the ED she is afebrile and feeling better.    Nursing Notes were reviewed     REVIEW OF SYSTEMS:     Review of Systems  Positives and pertinent negatives as per HPI.      PAST MEDICAL HISTORY:     Past Medical History:   Diagnosis Date    Abnormal Pap smear of cervix 2022    Biopsy was normal    ADHD     Anxiety     Depression     Postpartum depression 2024    Resolved    Thyroid disease     history of hypothyroidism, was on meds but not currently, states labs have

## 2025-06-10 NOTE — PROGRESS NOTES
PROGRESS NOTE   [facility]                                  [address], [address], [facility] [address]         Phone: [phone]    Date of Service:  6/10/2025   Patient ID: Kathya Causey is a 24 y.o. female    Consultants:   Patient Care Team:  Gilbert Ayon DO as PCP - General (Family Medicine)  Alyssa Hong MD [facility]  Iesha Aguilar MD as Consulting Physician (Family Medicine)    Assessment / Plan:   Alyssa Cauesy is a 24 y.o. female with a Hx of anxiety, depression, and eczema who presents with flu-like symptoms.    1. Flu-like symptoms  -Not at goal  -Patient is still experiencing muscle aches, fatigue, HA, and cough  -Ddx includes but is not limited to: Covid-19, influenza A/B, and common cold amongst others  -Low suspicion for HIV given monogamous relationship with  and negative HIV test in 2023  -Low suspicion for pneumonia given normal SpO2 and lack of focal lung findings   -Ordered Covid and Flu A/B: all which were negative  -Encouraged the patient to stay hydrated  -Continue Tylenol/Motrin as needed for fever/myalgia  -Patient may try Delsym for cough  -Discussed herbal remedies (e.g. pineapple, tumeric w/ black pepper, ginger)  -Return precautions discussed    Follow-up: As needed; Patient is to contact us in the interim with any questions or concerns    Regarding above-mentioned problems, strict return precautions were discussed in detail with patient, who verbalized understanding. The plans listed above are in accordance with the latest evidence-based practice guidelines from societies including but not limited to USPSTF, AAFP, ADA, GEO/AHA, and ALEXANDREA, amongst others. If the plans deviate from the guidelines or from the latest evidence of practice, they are done so with shared decision-making.    Health Maintenance Due:  Health Maintenance Due   Topic Date Due    HPV

## 2025-06-11 ENCOUNTER — TELEPHONE (OUTPATIENT)
Dept: PRIMARY CARE CLINIC | Age: 25
End: 2025-06-11

## 2025-06-11 ENCOUNTER — RESULTS FOLLOW-UP (OUTPATIENT)
Dept: EMERGENCY DEPT | Age: 25
End: 2025-06-11

## 2025-06-11 LAB
HAV IGM SERPL QL IA: NORMAL
HBV CORE IGM SERPL QL IA: NORMAL
HBV SURFACE AG SERPL QL IA: NORMAL
HCV AB SERPL QL IA: NORMAL

## 2025-06-11 NOTE — DISCHARGE INSTRUCTIONS
Contact your primary care provider tomorrow.    You will need to follow-up on your hepatitis testing.    You will need to have repeat liver function testing done.

## 2025-06-12 ENCOUNTER — OFFICE VISIT (OUTPATIENT)
Dept: PRIMARY CARE CLINIC | Age: 25
End: 2025-06-12

## 2025-06-12 VITALS
BODY MASS INDEX: 27.35 KG/M2 | HEART RATE: 102 BPM | SYSTOLIC BLOOD PRESSURE: 122 MMHG | DIASTOLIC BLOOD PRESSURE: 74 MMHG | TEMPERATURE: 98.2 F | WEIGHT: 185.2 LBS | OXYGEN SATURATION: 96 %

## 2025-06-12 DIAGNOSIS — R68.89 FLU-LIKE SYMPTOMS: Primary | ICD-10-CM

## 2025-06-12 NOTE — TELEPHONE ENCOUNTER
-Patient sent a message on [employer] stating that she had a severe headache earlier today  -Called patient and she was feeling better after taking Excedrin  -Will see patient tomorrow morning for an office visit    Gilbert Ayon DO   PGY-2  [facility]

## 2025-06-12 NOTE — PROGRESS NOTES
is ill-appearing.   HENT:      Head: Normocephalic and atraumatic.      Right Ear: Tympanic membrane, ear canal and external ear normal.      Left Ear: Tympanic membrane, ear canal and external ear normal.      Nose: Rhinorrhea present.      Mouth/Throat:      Mouth: Mucous membranes are moist.   Eyes:      Extraocular Movements: Extraocular movements intact.   Cardiovascular:      Rate and Rhythm: Normal rate and regular rhythm.   Pulmonary:      Effort: Pulmonary effort is normal.      Breath sounds: Normal breath sounds.   Abdominal:      General: Abdomen is flat.   Musculoskeletal:         General: No deformity or signs of injury.      Cervical back: Neck supple.   Skin:     General: Skin is warm and dry.   Neurological:      General: No focal deficit present.      Mental Status: She is alert.   Psychiatric:         Mood and Affect: Mood normal.         Behavior: Behavior normal.         Thought Content: Thought content normal.         Judgment: Judgment normal.         EMR Dragon/transcription disclaimer:  Much of this encounter note is electronic transcription/translation of spoken language to printed texts.  The electronic translation of spoken language may be erroneous, or at times, nonsensical words or phrases may be inadvertently transcribed.  Although I have reviewed the note for such errors, some may still exist.     Gilbert Ayon,    PGY-2  [facility]

## 2025-06-13 ENCOUNTER — APPOINTMENT (OUTPATIENT)
Dept: GENERAL RADIOLOGY | Age: 25
DRG: 195 | End: 2025-06-13
Payer: COMMERCIAL

## 2025-06-13 ENCOUNTER — HOSPITAL ENCOUNTER (INPATIENT)
Age: 25
LOS: 1 days | Discharge: HOME | DRG: 195 | End: 2025-06-14
Attending: EMERGENCY MEDICINE | Admitting: INTERNAL MEDICINE
Payer: COMMERCIAL

## 2025-06-13 DIAGNOSIS — E87.6 HYPOKALEMIA: Primary | ICD-10-CM

## 2025-06-13 DIAGNOSIS — J18.9 PNEUMONIA OF LEFT LOWER LOBE DUE TO INFECTIOUS ORGANISM: ICD-10-CM

## 2025-06-13 DIAGNOSIS — E83.42 HYPOMAGNESEMIA: ICD-10-CM

## 2025-06-13 LAB
ALBUMIN SERPL-MCNC: 3.5 G/DL (ref 3.4–5)
ALBUMIN/GLOB SERPL: 1.5 {RATIO} (ref 1.1–2.2)
ALP SERPL-CCNC: 117 U/L (ref 40–129)
ALT SERPL-CCNC: 126 U/L (ref 10–40)
ANION GAP SERPL CALCULATED.3IONS-SCNC: 14 MMOL/L (ref 3–16)
AST SERPL-CCNC: 59 U/L (ref 15–37)
B-HCG SERPL EIA 3RD IS-ACNC: <5 MIU/ML
BACTERIA URNS QL MICRO: ABNORMAL /HPF
BASOPHILS # BLD: 0 K/UL (ref 0–0.2)
BASOPHILS NFR BLD: 0.2 %
BILIRUB SERPL-MCNC: 0.6 MG/DL (ref 0–1)
BILIRUB UR QL STRIP.AUTO: NEGATIVE
BUN SERPL-MCNC: 8 MG/DL (ref 7–20)
CALCIUM SERPL-MCNC: 7.9 MG/DL (ref 8.3–10.6)
CHLORIDE SERPL-SCNC: 100 MMOL/L (ref 99–110)
CLARITY UR: ABNORMAL
CO2 SERPL-SCNC: 19 MMOL/L (ref 21–32)
COLOR UR: YELLOW
CREAT SERPL-MCNC: 0.6 MG/DL (ref 0.6–1.1)
DEPRECATED RDW RBC AUTO: 13 % (ref 12.4–15.4)
EOSINOPHIL # BLD: 0 K/UL (ref 0–0.6)
EOSINOPHIL NFR BLD: 0 %
EPI CELLS #/AREA URNS HPF: ABNORMAL /HPF (ref 0–5)
FLUAV RNA RESP QL NAA+PROBE: NOT DETECTED
FLUBV RNA RESP QL NAA+PROBE: NOT DETECTED
GFR SERPLBLD CREATININE-BSD FMLA CKD-EPI: >90 ML/MIN/{1.73_M2}
GLUCOSE SERPL-MCNC: 106 MG/DL (ref 70–99)
GLUCOSE UR STRIP.AUTO-MCNC: NEGATIVE MG/DL
HCT VFR BLD AUTO: 34 % (ref 36–48)
HGB BLD-MCNC: 12 G/DL (ref 12–16)
HGB UR QL STRIP.AUTO: ABNORMAL
KETONES UR STRIP.AUTO-MCNC: 15 MG/DL
LACTATE BLDV-SCNC: 0.8 MMOL/L (ref 0.4–1.9)
LEUKOCYTE ESTERASE UR QL STRIP.AUTO: NEGATIVE
LYMPHOCYTES # BLD: 0.3 K/UL (ref 1–5.1)
LYMPHOCYTES NFR BLD: 6.8 %
MAGNESIUM SERPL-MCNC: 1.66 MG/DL (ref 1.8–2.4)
MCH RBC QN AUTO: 28.4 PG (ref 26–34)
MCHC RBC AUTO-ENTMCNC: 35.3 G/DL (ref 31–36)
MCV RBC AUTO: 80.5 FL (ref 80–100)
MONOCYTES # BLD: 0.2 K/UL (ref 0–1.3)
MONOCYTES NFR BLD: 5.3 %
MUCOUS THREADS #/AREA URNS LPF: ABNORMAL /LPF
NEUTROPHILS # BLD: 3.3 K/UL (ref 1.7–7.7)
NEUTROPHILS NFR BLD: 87.7 %
NITRITE UR QL STRIP.AUTO: NEGATIVE
PH UR STRIP.AUTO: 6 [PH] (ref 5–8)
PLATELET # BLD AUTO: 181 K/UL (ref 135–450)
PMV BLD AUTO: 8.4 FL (ref 5–10.5)
POTASSIUM SERPL-SCNC: 3.1 MMOL/L (ref 3.5–5.1)
PROCALCITONIN SERPL IA-MCNC: 0.11 NG/ML (ref 0–0.15)
PROT SERPL-MCNC: 5.8 G/DL (ref 6.4–8.2)
PROT UR STRIP.AUTO-MCNC: 30 MG/DL
RBC # BLD AUTO: 4.23 M/UL (ref 4–5.2)
RBC #/AREA URNS HPF: ABNORMAL /HPF (ref 0–4)
SARS-COV-2 RNA RESP QL NAA+PROBE: NOT DETECTED
SODIUM SERPL-SCNC: 133 MMOL/L (ref 136–145)
SP GR UR STRIP.AUTO: 1.02 (ref 1–1.03)
UA COMPLETE W REFLEX CULTURE PNL UR: ABNORMAL
UA DIPSTICK W REFLEX MICRO PNL UR: YES
URN SPEC COLLECT METH UR: ABNORMAL
UROBILINOGEN UR STRIP-ACNC: 4 E.U./DL
WBC # BLD AUTO: 3.7 K/UL (ref 4–11)
WBC #/AREA URNS HPF: ABNORMAL /HPF (ref 0–5)

## 2025-06-13 PROCEDURE — 84145 PROCALCITONIN (PCT): CPT

## 2025-06-13 PROCEDURE — 83605 ASSAY OF LACTIC ACID: CPT

## 2025-06-13 PROCEDURE — 6370000000 HC RX 637 (ALT 250 FOR IP): Performed by: NURSE PRACTITIONER

## 2025-06-13 PROCEDURE — 6360000002 HC RX W HCPCS: Performed by: STUDENT IN AN ORGANIZED HEALTH CARE EDUCATION/TRAINING PROGRAM

## 2025-06-13 PROCEDURE — 83735 ASSAY OF MAGNESIUM: CPT

## 2025-06-13 PROCEDURE — 6360000002 HC RX W HCPCS: Performed by: PHYSICIAN ASSISTANT

## 2025-06-13 PROCEDURE — 80053 COMPREHEN METABOLIC PANEL: CPT

## 2025-06-13 PROCEDURE — 87636 SARSCOV2 & INF A&B AMP PRB: CPT

## 2025-06-13 PROCEDURE — 2500000003 HC RX 250 WO HCPCS: Performed by: NURSE PRACTITIONER

## 2025-06-13 PROCEDURE — 6370000000 HC RX 637 (ALT 250 FOR IP): Performed by: PHYSICIAN ASSISTANT

## 2025-06-13 PROCEDURE — 84702 CHORIONIC GONADOTROPIN TEST: CPT

## 2025-06-13 PROCEDURE — 6360000002 HC RX W HCPCS: Performed by: EMERGENCY MEDICINE

## 2025-06-13 PROCEDURE — 71045 X-RAY EXAM CHEST 1 VIEW: CPT

## 2025-06-13 PROCEDURE — 94761 N-INVAS EAR/PLS OXIMETRY MLT: CPT

## 2025-06-13 PROCEDURE — 96365 THER/PROPH/DIAG IV INF INIT: CPT

## 2025-06-13 PROCEDURE — 93005 ELECTROCARDIOGRAM TRACING: CPT

## 2025-06-13 PROCEDURE — 1200000000 HC SEMI PRIVATE

## 2025-06-13 PROCEDURE — 6370000000 HC RX 637 (ALT 250 FOR IP): Performed by: INTERNAL MEDICINE

## 2025-06-13 PROCEDURE — 36415 COLL VENOUS BLD VENIPUNCTURE: CPT

## 2025-06-13 PROCEDURE — 85025 COMPLETE CBC W/AUTO DIFF WBC: CPT

## 2025-06-13 PROCEDURE — 96375 TX/PRO/DX INJ NEW DRUG ADDON: CPT

## 2025-06-13 PROCEDURE — 6370000000 HC RX 637 (ALT 250 FOR IP): Performed by: EMERGENCY MEDICINE

## 2025-06-13 PROCEDURE — 99222 1ST HOSP IP/OBS MODERATE 55: CPT | Performed by: NURSE PRACTITIONER

## 2025-06-13 PROCEDURE — 87040 BLOOD CULTURE FOR BACTERIA: CPT

## 2025-06-13 PROCEDURE — 2580000003 HC RX 258: Performed by: NURSE PRACTITIONER

## 2025-06-13 PROCEDURE — 99285 EMERGENCY DEPT VISIT HI MDM: CPT

## 2025-06-13 PROCEDURE — 96366 THER/PROPH/DIAG IV INF ADDON: CPT

## 2025-06-13 PROCEDURE — 2580000003 HC RX 258: Performed by: PHYSICIAN ASSISTANT

## 2025-06-13 PROCEDURE — 96367 TX/PROPH/DG ADDL SEQ IV INF: CPT

## 2025-06-13 PROCEDURE — 81001 URINALYSIS AUTO W/SCOPE: CPT

## 2025-06-13 RX ORDER — ACETAMINOPHEN 650 MG/1
650 SUPPOSITORY RECTAL EVERY 6 HOURS PRN
Status: DISCONTINUED | OUTPATIENT
Start: 2025-06-13 | End: 2025-06-14 | Stop reason: HOSPADM

## 2025-06-13 RX ORDER — METOCLOPRAMIDE HYDROCHLORIDE 5 MG/ML
10 INJECTION INTRAMUSCULAR; INTRAVENOUS ONCE
Status: COMPLETED | OUTPATIENT
Start: 2025-06-13 | End: 2025-06-13

## 2025-06-13 RX ORDER — ENOXAPARIN SODIUM 100 MG/ML
40 INJECTION SUBCUTANEOUS DAILY
Status: DISCONTINUED | OUTPATIENT
Start: 2025-06-13 | End: 2025-06-14 | Stop reason: HOSPADM

## 2025-06-13 RX ORDER — POTASSIUM CHLORIDE 7.45 MG/ML
10 INJECTION INTRAVENOUS PRN
Status: DISCONTINUED | OUTPATIENT
Start: 2025-06-13 | End: 2025-06-14 | Stop reason: HOSPADM

## 2025-06-13 RX ORDER — SODIUM CHLORIDE 9 MG/ML
INJECTION, SOLUTION INTRAVENOUS CONTINUOUS
Status: ACTIVE | OUTPATIENT
Start: 2025-06-13 | End: 2025-06-14

## 2025-06-13 RX ORDER — POTASSIUM CHLORIDE 1500 MG/1
40 TABLET, EXTENDED RELEASE ORAL ONCE
Status: COMPLETED | OUTPATIENT
Start: 2025-06-13 | End: 2025-06-13

## 2025-06-13 RX ORDER — POTASSIUM CHLORIDE 1500 MG/1
40 TABLET, EXTENDED RELEASE ORAL PRN
Status: DISCONTINUED | OUTPATIENT
Start: 2025-06-13 | End: 2025-06-14 | Stop reason: HOSPADM

## 2025-06-13 RX ORDER — DIPHENHYDRAMINE HYDROCHLORIDE 50 MG/ML
25 INJECTION, SOLUTION INTRAMUSCULAR; INTRAVENOUS ONCE
Status: COMPLETED | OUTPATIENT
Start: 2025-06-13 | End: 2025-06-13

## 2025-06-13 RX ORDER — METRONIDAZOLE 250 MG/1
500 TABLET ORAL 2 TIMES DAILY
Status: DISCONTINUED | OUTPATIENT
Start: 2025-06-13 | End: 2025-06-14 | Stop reason: HOSPADM

## 2025-06-13 RX ORDER — 0.9 % SODIUM CHLORIDE 0.9 %
1000 INTRAVENOUS SOLUTION INTRAVENOUS ONCE
Status: COMPLETED | OUTPATIENT
Start: 2025-06-13 | End: 2025-06-13

## 2025-06-13 RX ORDER — SODIUM CHLORIDE 0.9 % (FLUSH) 0.9 %
5-40 SYRINGE (ML) INJECTION EVERY 12 HOURS SCHEDULED
Status: DISCONTINUED | OUTPATIENT
Start: 2025-06-13 | End: 2025-06-14 | Stop reason: HOSPADM

## 2025-06-13 RX ORDER — ACETAMINOPHEN 325 MG/1
650 TABLET ORAL EVERY 6 HOURS PRN
Status: DISCONTINUED | OUTPATIENT
Start: 2025-06-13 | End: 2025-06-14 | Stop reason: HOSPADM

## 2025-06-13 RX ORDER — SODIUM CHLORIDE 0.9 % (FLUSH) 0.9 %
5-40 SYRINGE (ML) INJECTION PRN
Status: DISCONTINUED | OUTPATIENT
Start: 2025-06-13 | End: 2025-06-14 | Stop reason: HOSPADM

## 2025-06-13 RX ORDER — KETOROLAC TROMETHAMINE 15 MG/ML
15 INJECTION, SOLUTION INTRAMUSCULAR; INTRAVENOUS ONCE
Status: COMPLETED | OUTPATIENT
Start: 2025-06-13 | End: 2025-06-13

## 2025-06-13 RX ORDER — POLYETHYLENE GLYCOL 3350 17 G/17G
17 POWDER, FOR SOLUTION ORAL DAILY PRN
Status: DISCONTINUED | OUTPATIENT
Start: 2025-06-13 | End: 2025-06-14 | Stop reason: HOSPADM

## 2025-06-13 RX ORDER — IPRATROPIUM BROMIDE AND ALBUTEROL SULFATE 2.5; .5 MG/3ML; MG/3ML
1 SOLUTION RESPIRATORY (INHALATION)
Status: DISCONTINUED | OUTPATIENT
Start: 2025-06-13 | End: 2025-06-13

## 2025-06-13 RX ORDER — IBUPROFEN 400 MG/1
400 TABLET, FILM COATED ORAL EVERY 6 HOURS PRN
Status: COMPLETED | OUTPATIENT
Start: 2025-06-13 | End: 2025-06-14

## 2025-06-13 RX ORDER — IPRATROPIUM BROMIDE AND ALBUTEROL SULFATE 2.5; .5 MG/3ML; MG/3ML
1 SOLUTION RESPIRATORY (INHALATION) EVERY 4 HOURS PRN
Status: DISCONTINUED | OUTPATIENT
Start: 2025-06-13 | End: 2025-06-14 | Stop reason: HOSPADM

## 2025-06-13 RX ORDER — ONDANSETRON 2 MG/ML
4 INJECTION INTRAMUSCULAR; INTRAVENOUS EVERY 6 HOURS PRN
Status: DISCONTINUED | OUTPATIENT
Start: 2025-06-13 | End: 2025-06-14 | Stop reason: HOSPADM

## 2025-06-13 RX ORDER — MAGNESIUM SULFATE IN WATER 40 MG/ML
2000 INJECTION, SOLUTION INTRAVENOUS PRN
Status: DISCONTINUED | OUTPATIENT
Start: 2025-06-13 | End: 2025-06-14 | Stop reason: HOSPADM

## 2025-06-13 RX ORDER — MAGNESIUM SULFATE IN WATER 40 MG/ML
2000 INJECTION, SOLUTION INTRAVENOUS ONCE
Status: COMPLETED | OUTPATIENT
Start: 2025-06-13 | End: 2025-06-13

## 2025-06-13 RX ORDER — SODIUM CHLORIDE 9 MG/ML
INJECTION, SOLUTION INTRAVENOUS PRN
Status: DISCONTINUED | OUTPATIENT
Start: 2025-06-13 | End: 2025-06-14 | Stop reason: HOSPADM

## 2025-06-13 RX ORDER — ONDANSETRON 4 MG/1
4 TABLET, ORALLY DISINTEGRATING ORAL EVERY 8 HOURS PRN
Status: DISCONTINUED | OUTPATIENT
Start: 2025-06-13 | End: 2025-06-14 | Stop reason: HOSPADM

## 2025-06-13 RX ORDER — VENLAFAXINE HYDROCHLORIDE 75 MG/1
150 CAPSULE, EXTENDED RELEASE ORAL DAILY
Status: DISCONTINUED | OUTPATIENT
Start: 2025-06-13 | End: 2025-06-14 | Stop reason: HOSPADM

## 2025-06-13 RX ORDER — ACETAMINOPHEN 500 MG
1000 TABLET ORAL ONCE
Status: COMPLETED | OUTPATIENT
Start: 2025-06-13 | End: 2025-06-13

## 2025-06-13 RX ADMIN — IPRATROPIUM BROMIDE AND ALBUTEROL SULFATE 1 DOSE: .5; 2.5 SOLUTION RESPIRATORY (INHALATION) at 15:36

## 2025-06-13 RX ADMIN — ACETAMINOPHEN 650 MG: 325 TABLET ORAL at 17:04

## 2025-06-13 RX ADMIN — POTASSIUM CHLORIDE 40 MEQ: 1500 TABLET, EXTENDED RELEASE ORAL at 13:38

## 2025-06-13 RX ADMIN — SODIUM CHLORIDE 1000 MG: 9 INJECTION, SOLUTION INTRAVENOUS at 12:44

## 2025-06-13 RX ADMIN — VENLAFAXINE HYDROCHLORIDE 150 MG: 75 CAPSULE, EXTENDED RELEASE ORAL at 15:35

## 2025-06-13 RX ADMIN — SODIUM CHLORIDE 1000 ML: 0.9 INJECTION, SOLUTION INTRAVENOUS at 12:22

## 2025-06-13 RX ADMIN — ACETAMINOPHEN 650 MG: 325 TABLET ORAL at 23:46

## 2025-06-13 RX ADMIN — Medication 10 ML: at 20:45

## 2025-06-13 RX ADMIN — METOCLOPRAMIDE 10 MG: 5 INJECTION, SOLUTION INTRAMUSCULAR; INTRAVENOUS at 21:12

## 2025-06-13 RX ADMIN — MAGNESIUM SULFATE HEPTAHYDRATE 2000 MG: 40 INJECTION, SOLUTION INTRAVENOUS at 13:37

## 2025-06-13 RX ADMIN — DIPHENHYDRAMINE HYDROCHLORIDE 25 MG: 50 INJECTION INTRAMUSCULAR; INTRAVENOUS at 21:12

## 2025-06-13 RX ADMIN — IBUPROFEN 400 MG: 400 TABLET ORAL at 18:59

## 2025-06-13 RX ADMIN — AZITHROMYCIN MONOHYDRATE 500 MG: 500 INJECTION, POWDER, LYOPHILIZED, FOR SOLUTION INTRAVENOUS at 13:13

## 2025-06-13 RX ADMIN — ACETAMINOPHEN 1000 MG: 500 TABLET ORAL at 12:23

## 2025-06-13 RX ADMIN — SODIUM CHLORIDE: 0.9 INJECTION, SOLUTION INTRAVENOUS at 17:08

## 2025-06-13 RX ADMIN — METRONIDAZOLE 500 MG: 250 TABLET ORAL at 20:44

## 2025-06-13 RX ADMIN — KETOROLAC TROMETHAMINE 15 MG: 15 INJECTION, SOLUTION INTRAMUSCULAR; INTRAVENOUS at 13:13

## 2025-06-13 ASSESSMENT — PAIN SCALES - GENERAL
PAINLEVEL_OUTOF10: 8
PAINLEVEL_OUTOF10: 5
PAINLEVEL_OUTOF10: 8
PAINLEVEL_OUTOF10: 10
PAINLEVEL_OUTOF10: 8

## 2025-06-13 ASSESSMENT — PAIN - FUNCTIONAL ASSESSMENT
PAIN_FUNCTIONAL_ASSESSMENT: PREVENTS OR INTERFERES SOME ACTIVE ACTIVITIES AND ADLS
PAIN_FUNCTIONAL_ASSESSMENT: 0-10
PAIN_FUNCTIONAL_ASSESSMENT: ACTIVITIES ARE NOT PREVENTED
PAIN_FUNCTIONAL_ASSESSMENT: ACTIVITIES ARE NOT PREVENTED

## 2025-06-13 ASSESSMENT — PAIN DESCRIPTION - DESCRIPTORS
DESCRIPTORS: ACHING
DESCRIPTORS: POUNDING;STABBING
DESCRIPTORS: ACHING
DESCRIPTORS: STABBING
DESCRIPTORS: ACHING

## 2025-06-13 ASSESSMENT — PAIN DESCRIPTION - LOCATION
LOCATION: HEAD

## 2025-06-13 ASSESSMENT — VISUAL ACUITY: OU: 1

## 2025-06-13 ASSESSMENT — PAIN DESCRIPTION - PAIN TYPE: TYPE: ACUTE PAIN

## 2025-06-13 ASSESSMENT — PAIN DESCRIPTION - ORIENTATION
ORIENTATION: MID
ORIENTATION: LEFT;ANTERIOR

## 2025-06-13 NOTE — H&P
[facility] Medicine History & Physical      PCP: Gilbert Ayon DO    Date of Admission: 6/13/2025    Date of Service: Pt seen/examined on 6/13/2025     Chief Complaint:    Chief Complaint   Patient presents with    Shortness of Breath     Pt c/o sob and respiratory symptoms x 6 days, states has been running a fever and ibuprofen doesn't seem to be keeping it down         History Of Present Illness:      The patient is a 24 y.o. female with h/o hypothyroidism, depression, anxiety who presents to [facility] with c/o shortness of breath. Symptoms have been ongoing for a month.  Other associated symptoms are cough, fever, body aches, nausea, diarrhea.  Feeling fatigued.     Patient febrile, HR tachycardic.  Labs with hypokalemia, hypomagnesemia, elevated LFT's (improved from prior), leukopenia.  Imaging with left lower lobe pneumonia.  Admitted for further management/care.     Past Medical History:        Diagnosis Date    Abnormal Pap smear of cervix 2022    Biopsy was normal    ADHD     Anxiety     Depression     Postpartum depression 2024    Resolved    Thyroid disease     history of hypothyroidism, was on meds but not currently, states labs have been normal       Past Surgical History:        Procedure Laterality Date    COLPOSCOPY  2022    Performed after abnorm pap smear. Results came back normal.    PILONIDAL CYST EXCISION N/A 01/17/2025    PILONIDAL CYST ABD ABCESS EXCISION performed by Bartolo Camacho MD at [facility] [place] [facility] OR    RECTAL SURGERY N/A 07/23/2024    INCISION AND DRAINAGE BUTTOCK ABSCESS performed by Bartolo Camacho MD at [facility] [place] [facility]    WISDOM TOOTH EXTRACTION         Medications Prior to Admission:    Prior to Admission medications    Medication Sig Start Date End Date Taking? Authorizing Provider   metroNIDAZOLE (FLAGYL) 500 MG tablet Take 1 tablet by mouth 2 times daily for 7 days 6/10/25 6/17/25  Jase Villatoro PA   ondansetron (ZOFRAN) 4 MG tablet Take 1 tablet by mouth 3 times daily  as needed for Nausea or Vomiting 6/10/25   Jase Villatoro PA   venlafaxine (EFFEXOR XR) 150 MG extended release capsule Take 1 capsule by mouth daily 4/15/25   Provider, MD Yi   ibuprofen (ADVIL;MOTRIN) 800 MG tablet Take 1 tablet by mouth every 6 hours as needed for Pain    ProviderYi MD   acetaminophen (TYLENOL) 325 MG tablet Take 2 tablets by mouth every 6 hours as needed for Pain    Provider, MD Yi       Allergies:  Patient has no known allergies.    Social History:    TOBACCO:   reports that she quit smoking about 2 years ago. Her smoking use included cigarettes. She started smoking about 3 years ago. She has a 0.1 pack-year smoking history. She has never used smokeless tobacco.  ETOH:   reports current alcohol use.      Family History:   Positive as follows:        Problem Relation Age of Onset    High Cholesterol Mother      Labor Mother     Spont Abortions Mother     Diabetes Father     Diabetes type 2  Father     Hypertension Father     Cancer Sister     Preeclampsia Sister     Spont Abortions Sister     Ulcerative Colitis Brother     Stroke Maternal Grandmother     Stroke Paternal Grandmother     Cancer Sister     Migraines Sister        REVIEW OF SYSTEMS:       Constitutional: + fever   Respiratory: +dyspnea, cough   Cardiovascular: Negative for chest pain   Gastrointestinal: Negative for vomiting, +nausea, diarrhea   Genitourinary: Negative for hematuria   Musculoskeletal: +myalgias   Skin: Negative for rash   Neurological: Negative for syncope    Psychiatric/Behavorial: Negative for anxiety    PHYSICAL EXAM:    /64   Pulse 97   Temp (!) 100.9 °F (38.3 °C) (Oral)   Resp 18   Ht 1.753 m (5' 9\")   Wt 84.9 kg (187 lb 3.2 oz)   LMP 2025 (Exact Date)   SpO2 96%   BMI 27.64 kg/m²     Gen: No distress. Alert. Ill-appearing  Eyes: PERRL. No sclera icterus. No conjunctival injection.   ENT: No discharge. Pharynx clear.   Neck:  Trachea midline.  Resp: No  Transcriptase Polymerase Chain  Reaction (RT-PCR)-based in vitro diagnostic test intended for the  qualitative detection of nucleic acids from SARS-CoV-2,  influenza A,influenza B and Respiratory Syncytial Virus  in nasopharyngeal and nasal swab specimens.    Patient Fact Sheet:  https://www.fda.gov/media/321161/download  Provider Fact Sheet: https://www.fda.gov/media/734899/download  EUA: https://www.fda.gov/media/226667/download  IFU: https://www.fda.gov/media/270672/download    Methodology:  RT-PCR          Influenza A NOT DETECTED     Influenza B NOT DETECTED    Wet prep, genital [[id number]]  (Abnormal) Collected: 06/10/25 1644    Order Status: Completed Specimen: Urine Updated: 06/10/25 1654     Trichomonas Prep None Seen     Yeast, Wet Prep None Seen     Clue Cells, Wet Prep 2+     WBC, Wet Prep 3+     RBC, Wet Prep 1+     Epi Cells <1+     Bacteria 2+     Source Wet Prep Vaginal    C.trachomatis N.gonorrhoeae DNA [[id number]] Collected: 06/10/25 1644    Order Status: Canceled Specimen: Urine               EKG:  I have reviewed the EKG with the following interpretation:   No results found for this or any previous visit (from the past 4464 hours).      RADIOLOGY  XR CHEST PORTABLE   Final Result   Left lower lobe pneumonia               Principal Problem:    Pneumonia, unspecified organism  Resolved Problems:    * No resolved hospital problems. *        ASSESSMENT/PLAN:  Sepsis, POA  -Fever, HR, WBC 3.7  -POA, due to pneumonia  -antibiotics, IVF's  -blood cx pending    Pneumonia, gram positive organism  -ordered pneumonia panel  -sputum cx  -strep pneumonia, legionella  -Zithromax, Rocephin D#1    Hypokalemia  Hypomagnesemia  -replaced.  -monitor labs    Elevated LFT's  -RUQ US  normal  -improved from prior.  -monitor     Depression, anxiety  -continue Effexor XR    Bacterial vaginosis  -on Flagyl    DVT Prophylaxis: Lovenox  Diet: No diet orders on file  Code Status: Prior    Note above makes patient higher

## 2025-06-13 NOTE — PROGRESS NOTES
Patient admitted to room 201. Oriented to room and call light. Bed wheels locked. Call light within reach and use of call light explained to patient.  No other needs identified at this time. Will continue to monitor.    4 Eyes Skin Assessment     The patient is being assess for   Admission    I agree that 2 RN's have performed a thorough Head to Toe Skin Assessment on the patient. ALL assessment sites listed below have been assessed.      Scattered eczema    Areas assessed for pressure by both nurses:   [x]   Head, Face, and Ears   [x]   Shoulders, Back, and Chest, Abdomen  [x]   Arms, Elbows, and Hands   [x]   Coccyx, Sacrum, and Ischium  [x]   Legs, Feet, and Heels        Skin Assessed Under all Medical Devices by both nurses:  securement devices              All Mepilex Borders were peeled back and area peeked at by both nurses:  No: n/a  Please list where Mepilex Borders are located:               **SHARE this note so that the co-signing nurse is able to place an eSignature**    Co-signer eSignature: {Esignature:[id number]}    Does the Patient have Skin Breakdown related to pressure?  No     (Insert Photo here)         Flo Prevention initiated:  No   Wound Care Orders initiated:  No      [facility] nurse consulted for Pressure Injury (Stage 3,4, Unstageable, DTI, NWPT, Complex wounds)and New or Established Ostomies:  No      Primary Nurse eSignature: Electronically signed by Kat Cisneros RN on 6/13/25 at 6:49 PM EDT          Bedside Mobility Assessment Tool (BMAT):     Assessment Level 1- Sit and Shake    1. From a semi-reclined position, ask patient to sit up and rotate to a seated position at the side of the bed. Can use the bedrail.    2. Ask patient to reach out and grab your hand and shake making sure patient reaches across his/her midline.   Pass- Patient is able to come to a seated position, maintain core strength. Maintains seated balance while reaching across midline. Move on to Assessment Level 2.      Assessment Level 2- Stretch and Point   1. With patient in seated position at the side of the bed, have patient place both feet on the floor (or stool) with knees no higher than hips.    2. Ask patient to stretch one leg and straighten the knee, then bend the ankle/flex and point the toes. If appropriate, repeat with the other leg.   Pass- Patient is able to demonstrate appropriate quad strength on intended weight bearing limb(s). Move onto Assessment Level 3.     Assessment Level 3- Stand   1. Ask patient to elevate off the bed or chair (seated to standing) using an assistive device (cane, bedrail).    2. Patient should be able to raise buttocks off be and hold for a count of five. May repeat once.   Pass- Patient maintains standing stability for at least 5 seconds, proceed to assessment level 4.    Assessment Level 4- Walk   1. Ask patient to march in place at bedside.    2. Then ask patient to advance step and return each foot. Some medical conditions may render a patient from stepping backwards, use your best clinical judgement.   Pass- Patient demonstrates balance while shifting weight and ability to step, takes independent steps, does not use assistive device patient is MOBILITY LEVEL 4.      Mobility Level- 4      Patient is able to demonstrate the ability to move from a reclining position to an upright position within the recliner.         IV discontinued, cath removed intact

## 2025-06-13 NOTE — ED PROVIDER NOTES
[facility]  EMERGENCY DEPARTMENT ENCOUNTER        Pt Name: Alyssa Causey  MRN: [id number]  Birthdate 2000  Date of evaluation: 6/13/2025  Provider: Linda Tolbert PA-C  PCP: Gilbert Ayon DO  Note Started: 12:06 PM EDT 6/13/25       I have seen and evaluated this patient with my supervising physician Odilia Mitchell MD.      CHIEF COMPLAINT       Chief Complaint   Patient presents with    Shortness of Breath     Pt c/o sob and respiratory symptoms x 6 days, states has been running a fever and ibuprofen doesn't seem to be keeping it down       HISTORY OF PRESENT ILLNESS: 1 or more Elements     History From: Patient and her     Limitations to history : None    Social Determinants Significantly Affecting Health : None    Chief Complaint: Shortness of breath    Alyssa Causey is a 24 y.o. female with a PMHx of anxiety, eczema, hypothyroidism (no longer on medication), who presents to the ED with 6 days of cough, shortness of breath, fevers 104F, diffuse myalgias, and is now endorsing diarrhea.  She also endorses an underlying dull headache with intermittent sudden surges of an \"ice pick\" going into her head/sharp pain, overall associated with mild photophobia.  She denies any episodes of confusion or memory loss recently, her  confirms this.  Also describes some neck stiffness but she attributes this to just lying down all week.  She had a pilonidal cyst surgically removed this past January. she presented to [facility] 3 days ago as well as her PCP where she obtained a chest x-ray that was nonacute, and was tested for COVID and flu which was negative, however she and her  note that that her PCP told her this may be the new COVID strain.  She was noted to have transaminitis however acute hepatic function panel was negative in the ED, also noted to have bacterial vaginosis and was given metronidazole.  She took ibuprofen this morning, and NyQuil last    SpO2: 95% 96%   Weight: 84.9 kg (187 lb 3.2 oz)    Height: 1.753 m (5' 9\")        Patient was given the following medications:  Medications   metroNIDAZOLE (FLAGYL) tablet 500 mg (has no administration in time range)   venlafaxine (EFFEXOR XR) extended release capsule 150 mg (150 mg Oral Given 6/13/25 1535)   sodium chloride flush 0.9 % injection 5-40 mL (has no administration in time range)   sodium chloride flush 0.9 % injection 5-40 mL (has no administration in time range)   0.9 % sodium chloride infusion (has no administration in time range)   potassium chloride (KLOR-CON M) extended release tablet 40 mEq (has no administration in time range)     Or   potassium bicarb-citric acid (EFFER-K) effervescent tablet 40 mEq (has no administration in time range)     Or   potassium chloride 10 mEq/100 mL IVPB (Peripheral Line) (has no administration in time range)   magnesium sulfate 2000 mg in 50 mL IVPB premix (has no administration in time range)   enoxaparin (LOVENOX) injection 40 mg (has no administration in time range)   ondansetron (ZOFRAN-ODT) disintegrating tablet 4 mg (has no administration in time range)     Or   ondansetron (ZOFRAN) injection 4 mg (has no administration in time range)   polyethylene glycol (GLYCOLAX) packet 17 g (has no administration in time range)   acetaminophen (TYLENOL) tablet 650 mg (650 mg Oral Given 6/13/25 1704)     Or   acetaminophen (TYLENOL) suppository 650 mg ( Rectal See Alternative 6/13/25 1704)   0.9 % sodium chloride infusion ( IntraVENous New Bag 6/13/25 1708)   azithromycin (ZITHROMAX) 500 mg in sodium chloride 0.9 % 250 mL IVPB (Nsfo2Icc) (has no administration in time range)   cefTRIAXone (ROCEPHIN) 2,000 mg in sodium chloride 0.9 % 50 mL IVPB (addEASE) (has no administration in time range)   ipratropium 0.5 mg-albuterol 2.5 mg (DUONEB) nebulizer solution 1 Dose (1 Dose Inhalation Given 6/13/25 1536)   sodium chloride 0.9 % bolus 1,000 mL (0 mLs IntraVENous Stopped      DISPOSITION Admitted 06/13/2025 02:04:44 PM               PATIENT REFERRED TO:  No follow-up provider specified.    DISCHARGE MEDICATIONS:  Current Discharge Medication List          DISCONTINUED MEDICATIONS:  Current Discharge Medication List                 (Please note that portions of this note were completed with a voice recognition program.  Efforts were made to edit the dictations but occasionally words are mis-transcribed.)    Linda Tolbert PA-C (electronically signed)      Linda Tolbert PA-C  06/13/25 [id number]

## 2025-06-13 NOTE — ED NOTES
Alyssa Causey is a 24 y.o. female admitted for  Principal Problem:    Pneumonia, unspecified organism  Active Problems:    Hypokalemia    Hypomagnesemia  Resolved Problems:    * No resolved hospital problems. *  .   Patient Home via personal transport  Chief Complaint   Patient presents with    Shortness of Breath     Pt c/o sob and respiratory symptoms x 6 days, states has been running a fever and ibuprofen doesn't seem to be keeping it down   .  Patient is alert and Person, Place, Time, and Situation  Patient's baseline mobility: Baseline Mobility: Independent   Code Status: Full Code   Cardiac Rhythm:       Is patient on baseline Oxygen: no how many Liters:   Abnormal Assessment Findings: pneumonia, cough.    Isolation: None      NIH Score:    C-SSRS: Risk of Suicide: No Risk  Bedside swallow:        Active LDA's:   Peripheral IV 06/13/25 Left Antecubital (Active)     Patient admitted with a garcia: no If the garcia is chronic was it exchanged:NA    Family/Caregiver Present no Any Concerns:no    Restraints no  Sitter no         Vitals: MEWS Score: 5    Vitals:    06/13/25 1151 06/13/25 1315   BP: 118/74 105/64   Pulse: (!) 119 97   Resp: 18 18   Temp: (!) 102.9 °F (39.4 °C) (!) 100.9 °F (38.3 °C)   TempSrc: Oral Oral   SpO2: 95% 96%   Weight: 84.9 kg (187 lb 3.2 oz)    Height: 1.753 m (5' 9\")        Last documented pain score (0-10 scale) Pain Level: 8  Pain medication administered No.    Pertinent or High Risk Medications/Drips: No.    Pending Blood Product Administration: no    Abnormal labs:   Abnormal Labs Reviewed   CBC WITH AUTO DIFFERENTIAL - Abnormal; Notable for the following components:       Result Value    WBC 3.7 (*)     Hematocrit 34.0 (*)     Lymphocytes Absolute 0.3 (*)     All other components within normal limits   COMPREHENSIVE METABOLIC PANEL W/ REFLEX TO MG FOR LOW K - Abnormal; Notable for the following components:    Sodium 133 (*)     Potassium reflex Magnesium 3.1 (*)     CO2 19 (*)      Glucose 106 (*)     Calcium 7.9 (*)     Total Protein 5.8 (*)      (*)     AST 59 (*)     All other components within normal limits   URINALYSIS WITH REFLEX TO CULTURE - Abnormal; Notable for the following components:    Clarity, UA SL CLOUDY (*)     Ketones, Urine 15 (*)     Blood, Urine TRACE-INTACT (*)     Protein, UA 30 (*)     Urobilinogen, Urine 4.0 (*)     All other components within normal limits   MICROSCOPIC URINALYSIS - Abnormal; Notable for the following components:    Mucus, UA 2+ (*)     Bacteria, UA 1+ (*)     All other components within normal limits   MAGNESIUM - Abnormal; Notable for the following components:    Magnesium 1.66 (*)     All other components within normal limits     Critical values: no  Intervention for critical value(s):     Abnormal Imaging: yes, pneumonia xRay            You may also review the ED PT Care Timeline found under the Summary Tab, ED Encounter Summary, Timeline Reports, ED Patient Care Timeline.     Recommendation    Pending orders/Uncompleted orders to hand off:  none    Additional Comments:   If any further questions, please call S[place] RN at [phone]

## 2025-06-14 VITALS
BODY MASS INDEX: 27.73 KG/M2 | HEART RATE: 88 BPM | DIASTOLIC BLOOD PRESSURE: 64 MMHG | WEIGHT: 187.2 LBS | OXYGEN SATURATION: 99 % | SYSTOLIC BLOOD PRESSURE: 101 MMHG | TEMPERATURE: 97.7 F | HEIGHT: 69 IN | RESPIRATION RATE: 16 BRPM

## 2025-06-14 PROBLEM — R11.2 NAUSEA AND VOMITING: Status: ACTIVE | Noted: 2024-03-22

## 2025-06-14 PROBLEM — R74.01 ELEVATION OF LEVELS OF LIVER TRANSAMINASE LEVELS: Status: ACTIVE | Noted: 2025-06-14

## 2025-06-14 LAB
ALBUMIN SERPL-MCNC: 3 G/DL (ref 3.4–5)
ALBUMIN/GLOB SERPL: 1.4 {RATIO} (ref 1.1–2.2)
ALP SERPL-CCNC: 102 U/L (ref 40–129)
ALT SERPL-CCNC: 92 U/L (ref 10–40)
ANION GAP SERPL CALCULATED.3IONS-SCNC: 11 MMOL/L (ref 3–16)
AST SERPL-CCNC: 45 U/L (ref 15–37)
BACTERIA BLD CULT: NORMAL
BASOPHILS # BLD: 0 K/UL (ref 0–0.2)
BASOPHILS NFR BLD: 0.1 %
BILIRUB SERPL-MCNC: 0.5 MG/DL (ref 0–1)
BUN SERPL-MCNC: 5 MG/DL (ref 7–20)
CALCIUM SERPL-MCNC: 7.7 MG/DL (ref 8.3–10.6)
CHLORIDE SERPL-SCNC: 105 MMOL/L (ref 99–110)
CO2 SERPL-SCNC: 20 MMOL/L (ref 21–32)
CREAT SERPL-MCNC: 0.6 MG/DL (ref 0.6–1.1)
DEPRECATED RDW RBC AUTO: 13.1 % (ref 12.4–15.4)
EKG ATRIAL RATE: 99 BPM
EKG DIAGNOSIS: NORMAL
EKG P AXIS: 30 DEGREES
EKG P-R INTERVAL: 142 MS
EKG Q-T INTERVAL: 348 MS
EKG QRS DURATION: 80 MS
EKG QTC CALCULATION (BAZETT): 446 MS
EKG R AXIS: 32 DEGREES
EKG T AXIS: 24 DEGREES
EKG VENTRICULAR RATE: 99 BPM
EOSINOPHIL # BLD: 0 K/UL (ref 0–0.6)
EOSINOPHIL NFR BLD: 0.1 %
GFR SERPLBLD CREATININE-BSD FMLA CKD-EPI: >90 ML/MIN/{1.73_M2}
GLUCOSE SERPL-MCNC: 103 MG/DL (ref 70–99)
HCT VFR BLD AUTO: 29.5 % (ref 36–48)
HGB BLD-MCNC: 10.6 G/DL (ref 12–16)
LYMPHOCYTES # BLD: 0.5 K/UL (ref 1–5.1)
LYMPHOCYTES NFR BLD: 14.7 %
MAGNESIUM SERPL-MCNC: 2.19 MG/DL (ref 1.8–2.4)
MCH RBC QN AUTO: 29 PG (ref 26–34)
MCHC RBC AUTO-ENTMCNC: 36 G/DL (ref 31–36)
MCV RBC AUTO: 80.4 FL (ref 80–100)
MONOCYTES # BLD: 0.4 K/UL (ref 0–1.3)
MONOCYTES NFR BLD: 11.6 %
NEUTROPHILS # BLD: 2.5 K/UL (ref 1.7–7.7)
NEUTROPHILS NFR BLD: 73.5 %
ORGANISM: ABNORMAL
PLATELET # BLD AUTO: 179 K/UL (ref 135–450)
PMV BLD AUTO: 7.7 FL (ref 5–10.5)
POTASSIUM SERPL-SCNC: 3.5 MMOL/L (ref 3.5–5.1)
PROT SERPL-MCNC: 5.1 G/DL (ref 6.4–8.2)
RBC # BLD AUTO: 3.67 M/UL (ref 4–5.2)
REPORT: NORMAL
RESP PATH DNA+RNA PNL L RESP NAA+NON-PRB: ABNORMAL
SODIUM SERPL-SCNC: 136 MMOL/L (ref 136–145)
WBC # BLD AUTO: 3.4 K/UL (ref 4–11)

## 2025-06-14 PROCEDURE — 87070 CULTURE OTHR SPECIMN AEROBIC: CPT

## 2025-06-14 PROCEDURE — 99239 HOSP IP/OBS DSCHRG MGMT >30: CPT | Performed by: INTERNAL MEDICINE

## 2025-06-14 PROCEDURE — 6370000000 HC RX 637 (ALT 250 FOR IP): Performed by: NURSE PRACTITIONER

## 2025-06-14 PROCEDURE — 80053 COMPREHEN METABOLIC PANEL: CPT

## 2025-06-14 PROCEDURE — 87633 RESP VIRUS 12-25 TARGETS: CPT

## 2025-06-14 PROCEDURE — 36415 COLL VENOUS BLD VENIPUNCTURE: CPT

## 2025-06-14 PROCEDURE — 83735 ASSAY OF MAGNESIUM: CPT

## 2025-06-14 PROCEDURE — 94640 AIRWAY INHALATION TREATMENT: CPT

## 2025-06-14 PROCEDURE — 87205 SMEAR GRAM STAIN: CPT

## 2025-06-14 PROCEDURE — 85025 COMPLETE CBC W/AUTO DIFF WBC: CPT

## 2025-06-14 PROCEDURE — 87449 NOS EACH ORGANISM AG IA: CPT

## 2025-06-14 PROCEDURE — 6360000002 HC RX W HCPCS: Performed by: NURSE PRACTITIONER

## 2025-06-14 PROCEDURE — 6370000000 HC RX 637 (ALT 250 FOR IP): Performed by: INTERNAL MEDICINE

## 2025-06-14 PROCEDURE — 2580000003 HC RX 258: Performed by: NURSE PRACTITIONER

## 2025-06-14 RX ORDER — AZITHROMYCIN 250 MG/1
250 TABLET, FILM COATED ORAL DAILY
Qty: 2 TABLET | Refills: 0 | Status: SHIPPED | OUTPATIENT
Start: 2025-06-15 | End: 2025-06-17

## 2025-06-14 RX ORDER — CEFDINIR 300 MG/1
300 CAPSULE ORAL 2 TIMES DAILY
Qty: 10 CAPSULE | Refills: 0 | Status: SHIPPED | OUTPATIENT
Start: 2025-06-15 | End: 2025-06-20

## 2025-06-14 RX ORDER — ONDANSETRON 4 MG/1
4 TABLET, FILM COATED ORAL 3 TIMES DAILY PRN
Qty: 15 TABLET | Refills: 0 | Status: SHIPPED | OUTPATIENT
Start: 2025-06-14

## 2025-06-14 RX ADMIN — CEFTRIAXONE SODIUM 2000 MG: 2 INJECTION, POWDER, FOR SOLUTION INTRAMUSCULAR; INTRAVENOUS at 12:45

## 2025-06-14 RX ADMIN — METRONIDAZOLE 500 MG: 250 TABLET ORAL at 09:19

## 2025-06-14 RX ADMIN — IPRATROPIUM BROMIDE AND ALBUTEROL SULFATE 1 DOSE: 2.5; .5 SOLUTION RESPIRATORY (INHALATION) at 12:06

## 2025-06-14 RX ADMIN — VENLAFAXINE HYDROCHLORIDE 150 MG: 75 CAPSULE, EXTENDED RELEASE ORAL at 09:19

## 2025-06-14 RX ADMIN — ACETAMINOPHEN 650 MG: 325 TABLET ORAL at 09:48

## 2025-06-14 RX ADMIN — AZITHROMYCIN MONOHYDRATE 500 MG: 500 INJECTION, POWDER, LYOPHILIZED, FOR SOLUTION INTRAVENOUS at 13:20

## 2025-06-14 RX ADMIN — ONDANSETRON 4 MG: 4 TABLET, ORALLY DISINTEGRATING ORAL at 15:22

## 2025-06-14 RX ADMIN — IBUPROFEN 400 MG: 400 TABLET ORAL at 03:56

## 2025-06-14 ASSESSMENT — PAIN SCALES - GENERAL
PAINLEVEL_OUTOF10: 0
PAINLEVEL_OUTOF10: 5

## 2025-06-14 ASSESSMENT — PAIN DESCRIPTION - DESCRIPTORS: DESCRIPTORS: ACHING

## 2025-06-14 ASSESSMENT — PAIN - FUNCTIONAL ASSESSMENT: PAIN_FUNCTIONAL_ASSESSMENT: ACTIVITIES ARE NOT PREVENTED

## 2025-06-14 ASSESSMENT — PAIN DESCRIPTION - ORIENTATION: ORIENTATION: LEFT;ANTERIOR

## 2025-06-14 ASSESSMENT — PAIN DESCRIPTION - LOCATION: LOCATION: HEAD

## 2025-06-14 NOTE — DISCHARGE SUMMARY
Hospital Medicine Discharge Summary    Patient: Alyssa Causey     Gender: female  : 2000   Age: 24 y.o.  MRN: [id number]    Admitting Physician: Susan Wagner DO  Discharge Physician: Susan Wagner DO    Code Status: Full Code     Admit Date: 2025   Discharge Date: 2025      Discharge Diagnoses:    Active Hospital Problems    Diagnosis Date Noted    Pneumonia, unspecified organism [J18.9] 2025    Hypokalemia [E87.6] 2025    Hypomagnesemia [E83.42] 2025     Condition at Discharge: Stable, feels much better, can go home later today if tolerating PO    Hospital Course:     SIRS, POA  -Fever, HR, WBC 3.7  -POA, due to pneumonia in the setting of viral illness, n/v and diarrhea  -antibiotics, IVF's  -blood cx pending     Pneumonia, gram positive organism  -ordered pneumonia panel  -sputum cx  -strep pneumonia, legionella  -Zithromax, Rocephin D#1     Hypokalemia  Hypomagnesemia  -replaced.  -monitor labs     Elevated LFT's suspected due to viral illness  -RUQ US normal  -improved from prior.  -monitor, outpatient follow-up      Depression, anxiety  -continue Effexor XR     Bacterial vaginosis  -on Flagyl    Additional findings or notes to primary provider:  None at this time    Discharge Medications:   Current Discharge Medication List        START taking these medications    Details   cefdinir (OMNICEF) 300 MG capsule Take 1 capsule by mouth 2 times daily for 5 days  Qty: 10 capsule, Refills: 0      azithromycin (ZITHROMAX) 250 MG tablet Take 1 tablet by mouth daily for 2 days  Qty: 2 tablet, Refills: 0           Current Discharge Medication List        CONTINUE these medications which have CHANGED    Details   ondansetron (ZOFRAN) 4 MG tablet Take 1 tablet by mouth 3 times daily as needed for Nausea or Vomiting  Qty: 15 tablet, Refills: 0           Current Discharge Medication List        CONTINUE these medications which have NOT CHANGED    Details   metroNIDAZOLE (FLAGYL) 500 MG  06/14/2025 06:17 AM    ALT 92 06/14/2025 06:17 AM    AST 45 06/14/2025 06:17 AM    BILITOT 0.5 06/14/2025 06:17 AM     Lab Results   Component Value Date    INR 1.00 12/02/2024       Radiology:  XR CHEST PORTABLE  Result Date: 6/13/2025  EXAMINATION: ONE XRAY VIEW OF THE CHEST 6/13/2025 12:06 pm COMPARISON: 06/10/2025 HISTORY: ORDERING SYSTEM PROVIDED HISTORY: Shortness of Breath, cough, fever TECHNOLOGIST PROVIDED HISTORY: Reason for exam:->Shortness of Breath, cough, fever Reason for Exam: SOB, cough, fever FINDINGS: Heart size stable.  Left lower lobe infiltrate.  No pneumothorax.  No pleural effusion.     Left lower lobe pneumonia     US GALLBLADDER RUQ  Result Date: 6/10/2025  LIMITED ABDOMINAL ULTRASOUND: HISTORY: Mild right upper quadrant pain, nausea. Transaminitis. TECHNIQUE: High-resolution ultrasound imaging performed. FINDINGS: The liver is homogeneous, and normal in echogenicity. There is hepatopedal flow demonstrated in the portal vein. The gallbladder appears normal, with no evidence of stones. There is no abnormal wall thickening, and no pericholecystic or free ascitic fluid is seen. There is no evidence of intrahepatic or extrahepatic biliary duct dilatation. The extrahepatic common duct measures 4 mm in diameter. Visualized portions of the pancreas and the right kidney are normal. The abdominal aorta is not well demonstrated. IVC appears patent.     Negative study. Electronically signed by Jaymie Milton MD    XR CHEST (2 VW)  Result Date: 6/10/2025  PA AND LATERAL CHEST: HISTORY: fever, cough. COMPARISON:  None. FINDINGS: The bony structures and soft tissues are unremarkable. The heart and pulmonary vasculature are within normal limits. The lungs are well-expanded bilaterally and are clear.     No acute cardiopulmonary findings.  Electronically signed by Jaymie Milton MD      The patient was seen and examined on day of discharge and this discharge summary is in conjunction with any daily progress

## 2025-06-14 NOTE — PLAN OF CARE
Problem: Discharge Planning  Goal: Discharge to home or other facility with appropriate resources  6/13/2025 2043 by Maria Victoria Rodriguez, RN  Outcome: Progressing  6/13/2025 1847 by Kat Cisneros RN  Outcome: Progressing     Problem: Pain  Goal: Verbalizes/displays adequate comfort level or baseline comfort level  6/13/2025 2043 by Maria Victoria Rodriguez, RN  Outcome: Progressing  6/13/2025 1847 by Kat Cisneros RN  Outcome: Progressing

## 2025-06-14 NOTE — ACP (ADVANCE CARE PLANNING)
Advance Care Planning     General Advance Care Planning (ACP) Conversation    Date of Conversation: 6/14/2025  Conducted with: Patient with Decision Making Capacity  Other persons present: None    Healthcare Decision Maker: No healthcare decision makers have been documented.       Content/Action Overview:  DECLINED ACP Conversation - will revisit periodically  Reviewed DNR/DNI and patient elects Full Code (Attempt Resuscitation)        Length of Voluntary ACP Conversation in minutes:  <16 minutes (Non-Billable)    Gio Spain RN

## 2025-06-14 NOTE — DISCHARGE INSTRUCTIONS
Your xray shows a left lower lobe pneumonia please complete antibiotics for bacterial infection.     You liver function tests are mildly elevated  and your white blood cell count is mildly decreased - this is most likely caused by a virus but you should still have follow-up with your primary care provider to make sure this gets better.     You appear to have some recent anemia. Your blood tests on this admission are similar to your recent tests - no major changes.    Please call for an appointment with your primary for follow-up on these medical concerns.

## 2025-06-14 NOTE — CARE COORDINATION
Case Management Assessment  Initial Evaluation    Date/Time of Evaluation: 6/14/2025 9:32 AM  Assessment Completed by: Gio Spain RN    If patient is discharged prior to next notation, then this note serves as note for discharge by case management.    Patient Name: Alyssa Causey                   YOB: 2000  Diagnosis: Hypokalemia [E87.6]  Hypomagnesemia [E83.42]  Pneumonia of left lower lobe due to infectious organism [J18.9]  Pneumonia, unspecified organism [J18.9]                   Date / Time: 6/13/2025 11:45 AM    Patient Admission Status: Inpatient   Readmission Risk (Low < 19, Mod (19-27), High > 27): Readmission Risk Score: 9.2    Current PCP: Gilbert Ayon, DO  PCP verified by CM? Yes    Chart Reviewed: Yes      History Provided by: Patient  Patient Orientation: Alert and Oriented    Patient Cognition: Alert    Hospitalization in the last 30 days (Readmission):  No    If yes, Readmission Assessment in CM Navigator will be completed.    Advance Directives:      Code Status: Full Code   Patient's Primary Decision Maker is: Legal Next of Kin      Discharge Planning:    Patient lives with: Spouse/Significant Other Type of Home: House  Primary Care Giver: Self  Patient Support Systems include: Spouse/Significant Other   Current Financial resources: Medicare  Current community resources: None  Current services prior to admission: None            Current DME:              Type of Home Care services:  None    ADLS  Prior functional level: Independent in ADLs/IADLs  Current functional level: Independent in ADLs/IADLs    PT AM-PAC:   /24  OT AM-PAC:   /24    Family can provide assistance at DC: Yes  Would you like Case Management to discuss the discharge plan with any other family members/significant others, and if so, who? No  Plans to Return to Present Housing: Yes  Other Identified Issues/Barriers to RETURNING to current housing: na    Potential Assistance needed at discharge: N/A             Potential DME:    Patient expects to discharge to: House  Plan for transportation at discharge: Family    Financial    Payor: [place] BCBS / Plan: [place] - [place] PPO / Product Type: *No Product type* /     Does insurance require precert for SNF: Yes    Potential assistance Purchasing Medications: No  Meds-to-Beds request:        [id number] - [place], [place] - [address] [address] - P [phone] - F [phone]  [address] [place] [id number]  Phone: [phone] Fax: [phone]      Notes:    Factors facilitating achievement of predicted outcomes: Family support, Cooperative, and Pleasant    Barriers to discharge: pneumonia    Additional Case Management Notes: Reviewed chart. Met with pt at bedside. Role of cm explained. IPTA. Lives w/spouse. +. +employed. Likely NN      The Plan for Transition of Care is related to the following treatment goals of Hypokalemia [E87.6]  Hypomagnesemia [E83.42]  Pneumonia of left lower lobe due to infectious organism [J18.9]  Pneumonia, unspecified organism [J18.9]    IF APPLICABLE: The Patient and/or patient representative Alyssa and her family were provided with a choice of provider and agrees with the discharge plan. Freedom of choice list with basic dialogue that supports the patient's individualized plan of care/goals and shares the quality data associated with the providers was provided to:     Patient Representative Name:       The Patient and/or Patient Representative Agree with the Discharge Plan?      Gio Spain RN  Case Management Department  Ph: [phone] Fax: [phone]

## 2025-06-14 NOTE — FLOWSHEET NOTE
06/14/25 0908   Vital Signs   Temp 98 °F (36.7 °C)   Temp Source Oral   Pulse 92   Heart Rate Source Monitor   BP 98/68   MAP (Calculated) 78   BP Location Right upper arm   BP Method Automatic   Pain Assessment   Pain Assessment None - Denies Pain   Oxygen Therapy   SpO2 95 %   O2 Device None (Room air)     AM assessment complete. Gave am meds due see mar. A/O x 4. Denies pain. Ice water given. Up as tolerated independent. Encouraged ambulation. Bed locked/lowest position. Call light within reach.

## 2025-06-14 NOTE — FLOWSHEET NOTE
06/14/25 1607   Vital Signs   Temp 97.7 °F (36.5 °C)   Temp Source Oral   Pulse 88   Heart Rate Source Monitor   Respirations 16   /64   MAP (Calculated) 76   BP Location Right upper arm   BP Method Automatic   Pain Assessment   Pain Assessment None - Denies Pain   Oxygen Therapy   SpO2 99 %   O2 Device None (Room air)     Prescriptions: Walmanfred Vega and discharge instructions given. Pt verbalized understanding denies any questions/ needs at this time. Pt walked off floor with spouse to vehicle for discharge home

## 2025-06-14 NOTE — PLAN OF CARE
Problem: Discharge Planning  Goal: Discharge to home or other facility with appropriate resources  6/14/2025 0730 by Silvina Acosta RN  Outcome: Progressing  6/13/2025 2043 by Maria Victoria Rodriguez RN  Outcome: Progressing  6/13/2025 1847 by Kat Cisneros RN  Outcome: Progressing     Problem: Pain  Goal: Verbalizes/displays adequate comfort level or baseline comfort level  6/14/2025 0730 by Silvina Acosta RN  Outcome: Progressing  6/13/2025 2043 by Maria Victoria Rodriguez RN  Outcome: Progressing  6/13/2025 1847 by Kat Cisneros RN  Outcome: Progressing

## 2025-06-14 NOTE — PROGRESS NOTES
RT Inhaler-Nebulizer Bronchodilator Protocol Note    There is a bronchodilator order in the chart from a provider indicating to follow the RT Bronchodilator Protocol and there is an “Initiate RT Inhaler-Nebulizer Bronchodilator Protocol” order as well (see protocol at bottom of note).    CXR Findings:  XR CHEST PORTABLE  Result Date: 6/13/2025  Left lower lobe pneumonia       The findings from the last RT Protocol Assessment were as follows:   History Pulmonary Disease: Smoker 15 pack years or more  Respiratory Pattern: Regular pattern and RR 12-20 bpm  Breath Sounds: Clear breath sounds  Cough: Strong, spontaneous, non-productive  Indication for Bronchodilator Therapy: None  Bronchodilator Assessment Score: 1    Aerosolized bronchodilator medication orders have been revised according to the RT Inhaler-Nebulizer Bronchodilator Protocol below.    Respiratory Therapist to perform RT Therapy Protocol Assessment initially then follow the protocol.  Repeat RT Therapy Protocol Assessment PRN for score 0-3 or on second treatment, BID, and PRN for scores above 3.    No Indications - adjust the frequency to every 6 hours PRN wheezing or bronchospasm, if no treatments needed after 48 hours then discontinue using Per Protocol order mode.     If indication present, adjust the RT bronchodilator orders based on the Bronchodilator Assessment Score as indicated below.  Use Inhaler orders unless patient has one or more of the following: on home nebulizer, not able to hold breath for 10 seconds, is not alert and oriented, cannot activate and use MDI correctly, or respiratory rate 25 breaths per minute or more, then use the equivalent nebulizer order(s) with same Frequency and PRN reasons based on the score.  If a patient is on this medication at home then do not decrease Frequency below that used at home.    0-3 - enter or revise RT bronchodilator order(s) to equivalent RT Bronchodilator order with Frequency of every 4 hours PRN for  wheezing or increased work of breathing using Per Protocol order mode.        4-6 - enter or revise RT Bronchodilator order(s) to two equivalent RT bronchodilator orders with one order with BID Frequency and one order with Frequency of every 4 hours PRN wheezing or increased work of breathing using Per Protocol order mode.        7-10 - enter or revise RT Bronchodilator order(s) to two equivalent RT bronchodilator orders with one order with TID Frequency and one order with Frequency of every 4 hours PRN wheezing or increased work of breathing using Per Protocol order mode.       11-13 - enter or revise RT Bronchodilator order(s) to one equivalent RT bronchodilator order with QID Frequency and an Albuterol order with Frequency of every 4 hours PRN wheezing or increased work of breathing using Per Protocol order mode.      Greater than 13 - enter or revise RT Bronchodilator order(s) to one equivalent RT bronchodilator order with every 4 hours Frequency and an Albuterol order with Frequency of every 2 hours PRN wheezing or increased work of breathing using Per Protocol order mode.         Electronically signed by La Jeter RCP on 6/13/2025 at 10:16 PM

## 2025-06-14 NOTE — PLAN OF CARE
Problem: Discharge Planning  Goal: Discharge to home or other facility with appropriate resources  6/14/2025 1359 by Silvina Acosta, RN  Outcome: Adequate for Discharge  6/14/2025 0730 by Silvina Acosta RN  Outcome: Progressing     Problem: Pain  Goal: Verbalizes/displays adequate comfort level or baseline comfort level  6/14/2025 1359 by Silvina Acosta, RN  Outcome: Adequate for Discharge  6/14/2025 0730 by Silvina Acosta, RN  Outcome: Progressing

## 2025-06-15 LAB
BACTERIA BLD CULT ORG #2: NORMAL
BACTERIA SPEC RESP CULT: NORMAL
GRAM STN SPEC: NORMAL
S PNEUM AG UR QL: NORMAL

## 2025-06-15 NOTE — ED PROVIDER NOTES
Emergency Department Attending Provider Note  Location: [facility] ED      Alyssa Causey was evaluated in the Emergency Department. Although initial history and physical exam information was obtained by Linda Tolbert (who also dictated a record of this visit), I personally saw the patient and made/approved the management plan and take responsibility for the patient management.     Patient seen and evaluated.  Relevant records reviewed. Chronic medical conditions reviewed.    HPI: 24-year-old female with a history of anxiety, hypothyroidism presents with 6 days of cough and shortness of breath with fevers as high as 104.  She is also having some diarrhea now and diffuse bodyaches.  She is having mild headaches.  She was in the [facility] ED 3 days ago with the symptoms and had a chest x-ray that was unremarkable.  She was tested for COVID and flu which were negative.  She was noted to have a mild transaminitis.  She said her symptoms are not improving.  She has been taking ibuprofen and Tylenol.     Physical Exam: Here she is tachycardic, ill-appearing, febrile on arrival.  Appears dyspneic.  Procalcitonin     MDM: Full septic workup initiated.  She is leukopenic, electrolytes otherwise normal.  Does have transaminitis but it is improving.  Urinalysis negative.  Procalcitonin and lactic acid negative, COVID and flu negative.  Chest x-ray is showing left lower lobe pneumonia.  We will start IV antibiotics and IV fluids and admit her to the hospital.         I independently interpreted the following diagnostic test and studies:    Results for orders placed or performed during the hospital encounter of 06/13/25   COVID-19 & Influenza Combo    Specimen: Nasopharyngeal Swab   Result Value Ref Range    SARS-CoV-2 RNA, RT PCR NOT DETECTED NOT DETECTED    Influenza A NOT DETECTED NOT DETECTED    Influenza B NOT DETECTED NOT DETECTED   Blood Culture 1    Specimen: Blood   Result Value Ref Range    Blood

## 2025-06-16 ENCOUNTER — OFFICE VISIT (OUTPATIENT)
Dept: PRIMARY CARE CLINIC | Age: 25
End: 2025-06-16

## 2025-06-16 ENCOUNTER — CARE COORDINATION (OUTPATIENT)
Dept: CASE MANAGEMENT | Age: 25
End: 2025-06-16

## 2025-06-16 VITALS
WEIGHT: 189.6 LBS | RESPIRATION RATE: 16 BRPM | SYSTOLIC BLOOD PRESSURE: 106 MMHG | HEART RATE: 89 BPM | DIASTOLIC BLOOD PRESSURE: 70 MMHG | BODY MASS INDEX: 28.08 KG/M2 | HEIGHT: 69 IN | OXYGEN SATURATION: 97 %

## 2025-06-16 DIAGNOSIS — B96.89 BACTERIAL VAGINOSIS: ICD-10-CM

## 2025-06-16 DIAGNOSIS — J18.9 PNEUMONIA OF LEFT LOWER LOBE DUE TO INFECTIOUS ORGANISM: ICD-10-CM

## 2025-06-16 DIAGNOSIS — N76.0 BACTERIAL VAGINOSIS: ICD-10-CM

## 2025-06-16 DIAGNOSIS — F41.9 ANXIETY: ICD-10-CM

## 2025-06-16 DIAGNOSIS — R74.01 ELEVATION OF LEVELS OF LIVER TRANSAMINASE LEVELS: ICD-10-CM

## 2025-06-16 DIAGNOSIS — E87.6 HYPOKALEMIA: ICD-10-CM

## 2025-06-16 DIAGNOSIS — E83.42 HYPOMAGNESEMIA: ICD-10-CM

## 2025-06-16 DIAGNOSIS — Z09 HOSPITAL DISCHARGE FOLLOW-UP: Primary | ICD-10-CM

## 2025-06-16 DIAGNOSIS — D50.8 OTHER IRON DEFICIENCY ANEMIA: ICD-10-CM

## 2025-06-16 DIAGNOSIS — F33.1 MODERATE EPISODE OF RECURRENT MAJOR DEPRESSIVE DISORDER (HCC): ICD-10-CM

## 2025-06-16 PROBLEM — D64.9 ABSOLUTE ANEMIA: Status: ACTIVE | Noted: 2025-06-16

## 2025-06-16 LAB
BACTERIA SPEC RESP CULT: NORMAL
GRAM STN SPEC: NORMAL

## 2025-06-16 NOTE — CARE COORDINATION
Care Transitions Note    Initial Call - Call within 2 business days of discharge: Yes    Attempted to reach patient for transitions of care follow up. Unable to reach patient.    Outreach Attempts:   Multiple attempts to contact patient at phone numbers on file.   Unable to leave message.     Patient: Alyssa Causey    Patient : 2000   MRN: [id number]    Reason for Admission:  Hypokalemia [E87.6]  Hypomagnesemia [E83.42]  Pneumonia of left lower lobe due to infectious organism [J18.9]  Pneumonia, unspecified organism [J18.9]   Discharge Date: 25  RURS: Readmission Risk Score: 9.2    Last Discharge Facility       Date Complaint Diagnosis Description Type Department Provider    25 Shortness of Breath Hypokalemia ... ED to Hosp-Admission (Discharged) (ADMITTED) [facility] [place] Susan Wagner DO; Odilia Mitchell ...            Was this an external facility discharge? No    Follow Up Appointment:   Patient has hospital follow up appointment scheduled within 7 days of discharge.    Future Appointments         Provider Specialty Dept Phone    2025 4:30 PM Gilbert Ayon DO Primary Care [phone]            Plan for follow-up on next business day. Second attempt HFU     La Timmons RN

## 2025-06-16 NOTE — PROGRESS NOTES
Post-Discharge Transitional Care  Follow Up      Alyssa Causey   YOB: 2000    Date of Office Visit:  6/16/2025  Date of Hospital Admission: 6/13/25  Date of Hospital Discharge: 6/14/25  Risk of hospital readmission (high >=14%. Medium >=10%) :Readmission Risk Score: 9.2      Care management risk score Rising risk (score 2-5) and Complex Care (Scores >=6): No Risk Score On File     Non face to face following discharge, date last encounter closed (first attempt may have been earlier): 06/16/2025    Call initiated 2 business days of discharge: Yes    ASSESSMENT/PLAN:   Alyssa Causey is a 24 y.o. female with a Hx of anxiety, depression, and eczema who presents for a hospital follow-up.    Hospital discharge follow-up  -Overall the patient is doing much better since being discharge from the hospital  -Reviewed all notes, labs, and imaging studies from recent hospitalization  -Patient is to send us [facility] paperwork as discussed  -Return to work precautions discussed  -ND DISCHARGE MEDS RECONCILED W/ CURRENT OUTPATIENT MED LIST    Pneumonia of left lower lobe due to infectious organism  -Improving  -Seen on recent XR Chest  -Sputum cultures revealed strep pneumo and Legionella  -Patient recently finished a course of Zithromax  -Continue cefdinir as prescribed  -If patient develops a yeast infection from antibiotic regimen then she may try Monistat or simply eating yogurt  -Return precautions discussed    Anemia  -Monitoring  -Patient has a history of iron deficiency anemia required iron supplementation in the past  -Recent CBC showed anemia  -Ordered CBC with Auto Differential, Iron and TIBC, Ferritin  -Plan to initiate iron supplementation if indicated to do so by lab results    Transaminitis  -Monitoring  -Likely secondary to viral illness  -Reviewed recent ultrasound right upper quadrant which was unremarkable  -Reviewed recent AST and ALT levels  -Ordered comprehensive metabolic

## 2025-06-17 ENCOUNTER — CARE COORDINATION (OUTPATIENT)
Dept: CASE MANAGEMENT | Age: 25
End: 2025-06-17

## 2025-06-17 LAB — BACTERIA BLD CULT: NORMAL

## 2025-06-17 ASSESSMENT — PATIENT HEALTH QUESTIONNAIRE - PHQ9
SUM OF ALL RESPONSES TO PHQ QUESTIONS 1-9: 7
9. THOUGHTS THAT YOU WOULD BE BETTER OFF DEAD, OR OF HURTING YOURSELF: NOT AT ALL
2. FEELING DOWN, DEPRESSED OR HOPELESS: SEVERAL DAYS
5. POOR APPETITE OR OVEREATING: MORE THAN HALF THE DAYS
7. TROUBLE CONCENTRATING ON THINGS, SUCH AS READING THE NEWSPAPER OR WATCHING TELEVISION: NOT AT ALL
SUM OF ALL RESPONSES TO PHQ QUESTIONS 1-9: 7
8. MOVING OR SPEAKING SO SLOWLY THAT OTHER PEOPLE COULD HAVE NOTICED. OR THE OPPOSITE, BEING SO FIGETY OR RESTLESS THAT YOU HAVE BEEN MOVING AROUND A LOT MORE THAN USUAL: NOT AT ALL
10. IF YOU CHECKED OFF ANY PROBLEMS, HOW DIFFICULT HAVE THESE PROBLEMS MADE IT FOR YOU TO DO YOUR WORK, TAKE CARE OF THINGS AT HOME, OR GET ALONG WITH OTHER PEOPLE: SOMEWHAT DIFFICULT
6. FEELING BAD ABOUT YOURSELF - OR THAT YOU ARE A FAILURE OR HAVE LET YOURSELF OR YOUR FAMILY DOWN: NOT AT ALL
SUM OF ALL RESPONSES TO PHQ QUESTIONS 1-9: 7
1. LITTLE INTEREST OR PLEASURE IN DOING THINGS: SEVERAL DAYS
SUM OF ALL RESPONSES TO PHQ QUESTIONS 1-9: 7
3. TROUBLE FALLING OR STAYING ASLEEP: SEVERAL DAYS
4. FEELING TIRED OR HAVING LITTLE ENERGY: MORE THAN HALF THE DAYS

## 2025-06-17 NOTE — PROGRESS NOTES
Post-Discharge Transitional Care  Follow Up      Alyssa Causey   YOB: 2000    Date of Office Visit:  6/16/2025  Date of Hospital Admission: 6/13/25  Date of Hospital Discharge: 6/14/25  Risk of hospital readmission (high >=14%. Medium >=10%) :Readmission Risk Score: 9.2      Care management risk score Rising risk (score 2-5) and Complex Care (Scores >=6): No Risk Score On File     Non face to face following discharge, date last encounter closed (first attempt may have been earlier): 06/16/2025    Call initiated 2 business days of discharge: Yes    ASSESSMENT/PLAN:   Alyssa Causey is a 24 y.o. female with a Hx of anxiety, depression, and eczema who presents for a hospital follow-up.    Hospital discharge follow-up  -Overall the patient is doing much better since being discharge from the hospital  -Reviewed all notes, labs, and imaging studies from recent hospitalization  -Patient is to send us [facility] paperwork as discussed  -Return to work precautions discussed  -LA DISCHARGE MEDS RECONCILED W/ CURRENT OUTPATIENT MED LIST    Pneumonia of left lower lobe due to infectious organism  -Improving  -Seen on recent XR Chest  -Sputum cultures revealed strep pneumo and Legionella  -Patient recently finished a course of Zithromax  -Continue cefdinir as prescribed  -If patient develops a yeast infection from antibiotic regimen then she may try Monistat or simply eating yogurt  -Return precautions discussed    Anemia  -Monitoring  -Patient has a history of iron deficiency anemia required iron supplementation in the past  -Recent CBC showed anemia  -Ordered CBC with Auto Differential, Iron and TIBC, Ferritin  -Plan to initiate iron supplementation if indicated to do so by lab results    Transaminitis  -Monitoring  -Likely secondary to viral illness  -Reviewed recent ultrasound right upper quadrant which was unremarkable  -Reviewed recent AST and ALT levels  -Ordered comprehensive metabolic

## 2025-06-17 NOTE — CARE COORDINATION
Care Transitions Note    Initial Call - Call within 2 business days of discharge: Yes    Attempted to reach patient for transitions of care follow up. Unable to reach patient.    Outreach Attempts:   HIPAA compliant voicemail left for patient.     Patient: Alyssa Causey    Patient : 2000   MRN: [id number]    Reason for Admission: fever - cough - SOB  Discharge Date: 25  RURS: Readmission Risk Score: 9.2    Last Discharge Facility       Date Complaint Diagnosis Description Type Department Provider    25 Shortness of Breath Hypokalemia ... ED to Hosp-Admission (Discharged) (ADMITTED) [facility] [place] Susan Wagner, DO; Odilia Mitchell ...            Was this an external facility discharge? No    Follow Up Appointment:   Patient has hospital follow up appointment scheduled within 7 days of discharge.        Plan for follow-up on next business day.      Meme Zhang RN BSN  Care Transition Nurse  [phone]

## 2025-06-18 ENCOUNTER — CARE COORDINATION (OUTPATIENT)
Dept: CASE MANAGEMENT | Age: 25
End: 2025-06-18

## 2025-06-18 LAB — BACTERIA BLD CULT ORG #2: NORMAL

## 2025-06-18 NOTE — CARE COORDINATION
Care Transitions Note    Initial Call - Call within 2 business days of discharge: Yes    Patient Current Location:  Home: [address] [place] [address]    Care Transition Nurse contacted the patient by telephone to perform post hospital discharge assessment, verified name and  as identifiers.  Provided introduction to self, and explanation of the Care Transition Nurse role.    Patient: Alyssa Causey    Patient : 2000   MRN: [id number]    Reason for Admission: fever - cough - SOB  Discharge Date: 25  RURS: Readmission Risk Score: 9.2      Last Discharge Facility       Date Complaint Diagnosis Description Type Department Provider    25 Shortness of Breath Hypokalemia ... ED to Hosp-Admission (Discharged) (ADMITTED) [facility] [place] Susan Wagner DO; Odilia Mitchell ...            Was this an external facility discharge? No    Additional needs identified to be addressed with provider   No needs identified             Method of communication with provider: none.    Patients top risk factors for readmission: medical condition-fever - cough - SOB    Interventions to address risk factors:   Review of patient management of conditions/medications: fever - cough - SOB    Care Summary Note: Alyssa states she is doing \"good - ok.\"  Alyssa states her HFU appt with the PCP went well. She states she will be following up with some lab work.  She states she continues to have some SOB with increased activity. She states she does not have any SOB at rest. She states it is not consistent - it is random - and there are no triggers. She states it is getting better.  Alyssa states she has chest \"tightness\" when she has an episode of SOB. No real chest pain.  She states she has an intermittent productive cough.  Alyssa denies any fever or body aches at this time.  She states she is tolerating food & fluids - feels she is staying hydrated.  Alyssa states she is having some nausea & loose stool. She feels this

## 2025-06-19 NOTE — PROGRESS NOTES
Physician Progress Note      PATIENT:               TERRIE GODINEZ  CSN #:                  [id number]  :                       2000  ADMIT DATE:       2025 11:45 AM  DISCH DATE:        2025 4:28 PM  RESPONDING  PROVIDER #:        Susan Wagner DO          QUERY TEXT:    The attending physician is required to clarify conflicting documentation in   the medical record.  Noted documentation of Sepsis POA in H&P and SIRS POA in   Discharge summary.  Based on your medical judgement, please clarify the   following:    The clinical indicators include:  Per H&P   \"Sepsis, POA  -Fever, HR, WBC 3.7  -POA, due to pneumonia  -antibiotics, IVF's  -blood cx pending\"    Per DC summary ,  \"SIRS, POA  -Fever, HR, WBC 3.7  -POA, due to pneumonia in the setting of viral illness, n/v and diarrhea  -antibiotics, IVF's  -blood cx pending\"    Upon arrival:  Labs: wbc 3.7, lac 0.8,  Vitals: 102.9,   Options provided:  -- After Study, Sepsis ruled out and SIRS due to gram positive PNA ruled in.  -- After Study, sepsis POA ruled in, SIRS ruled out.  -- Other - I will add my own diagnosis  -- Disagree - Not applicable / Not valid  -- Disagree - Clinically unable to determine / Unknown  -- Refer to Clinical Documentation Reviewer    PROVIDER RESPONSE TEXT:    After Study, Sepsis ruled out and SIRS due to gram positive PNA ruled in.    Query created by: Marlena Ruiz on 2025 9:29 AM      Electronically signed by:  Susan Wagner DO 2025 1:45 PM

## 2025-06-24 ENCOUNTER — TELEPHONE (OUTPATIENT)
Dept: PRIMARY CARE CLINIC | Age: 25
End: 2025-06-24

## 2025-06-24 NOTE — TELEPHONE ENCOUNTER
Patient is calling for update about her FMLA paper work and asking if there is anything else for the patient to do for the paper work , Patient is also asking about eta on when the paper work will be done .    Please advise  Patient name: francisco lindsey   Ph:[phone]

## 2025-06-25 ENCOUNTER — CARE COORDINATION (OUTPATIENT)
Dept: CASE MANAGEMENT | Age: 25
End: 2025-06-25

## 2025-06-25 NOTE — CARE COORDINATION
Care Transitions Note    Follow Up Call     Patient Current Location:  Home: [address] [place] [id number]    LPN Care Coordinator contacted the patient by telephone. Verified name and  as identifiers.    Additional needs identified to be addressed with provider   No needs identified                 Method of communication with provider: none.    Care Summary Note: LPN CC spoke with patient. States she's ok. Really fatigued. Denies SOB, cough, CP, fever/chills, N/V. Some loose stool. Completed atbx. Appetite ok. Denies problems with bowels or bladder. Denies new or changed medications. Taking OTC probiotic. Denies needs.    Thank you,   Josie Francisco LPN Care Coordinator   [facility]  Remote Patient Monitoring, Care Transitions, Ambulatory Care Management  [phone]    Plan of care updates since last contact:  Review of patient management of conditions/medications:         Advance Care Planning:   Does patient have an Advance Directive: deferred at this time, will discuss on future follow up. .    Medication Review:  No changes since last call.     Remote Patient Monitoring:  Offered patient enrollment in the Remote Patient Monitoring (RPM) program for in-home monitoring: Patient is not eligible for RPM program because: patient does not have qualifying diagnosis.    Assessments:  Care Transitions Subsequent and Final Call    Subsequent and Final Calls  Care Transitions Interventions  Other Interventions:              Follow Up Appointment:   Reviewed upcoming appointment(s).      LPN Care Coordinator provided contact information.  Plan for follow-up call in 6-10 days based on severity of symptoms and risk factors.  Plan for next call: symptom management-s/s infection, SOB, cough  medication management-new or changed meds?     Josie Francisco LPN

## 2025-07-02 ENCOUNTER — CARE COORDINATION (OUTPATIENT)
Dept: CASE MANAGEMENT | Age: 25
End: 2025-07-02

## 2025-07-02 NOTE — CARE COORDINATION
Care Transitions Note    Follow Up Call     Attempted to reach patient for transitions of care follow up.  Unable to reach patient.      Outreach Attempts:   HIPAA compliant voicemail left for patient.     Follow Up Appointment:       Plan for follow-up call in 6-10 days based on severity of symptoms and risk factors. Plan for next call: symptom management-s/s infection, SOB, cough  medication management-new or changed meds?     Josie Francisco LPN

## 2025-07-09 ENCOUNTER — CARE COORDINATION (OUTPATIENT)
Dept: CASE MANAGEMENT | Age: 25
End: 2025-07-09

## 2025-07-09 NOTE — CARE COORDINATION
Care Transitions Note    Follow Up Call     Attempted to reach patient for transitions of care follow up.  Unable to reach patient.      Outreach Attempts:   HIPAA compliant voicemail left for patient.     Care Summary Note: N/A    Follow Up Appointment:       Plan for follow-up call in 2-5 days based on inability to reach patient today.    Meme Zhang RN BSN  Care Transition Nurse  [phone]

## 2025-07-11 ENCOUNTER — CARE COORDINATION (OUTPATIENT)
Dept: CARE COORDINATION | Age: 25
End: 2025-07-11

## 2025-07-11 NOTE — CARE COORDINATION
Care Transitions Note    Final Call     Attempted to reach patient for transitions of care final follow up.  Unable to reach patient.      Outreach Attempts:   HIPAA compliant voicemail left for patient.     No further follow-up call indicated     Patricia Gabriel

## 2025-07-16 PROBLEM — Z09 HOSPITAL DISCHARGE FOLLOW-UP: Status: RESOLVED | Noted: 2025-06-16 | Resolved: 2025-07-16

## (undated) DEVICE — TUBING, SUCTION, 3/16" X 12', STRAIGHT: Brand: MEDLINE

## (undated) DEVICE — SUTURE VICRYL + SZ 2-0 L18IN ABSRB UD CT1 L36MM 1/2 CIR VCP839D

## (undated) DEVICE — PAD,ABDOMINAL,8"X10",ST,LF: Brand: MEDLINE

## (undated) DEVICE — GLOVE ORANGE PI 7   MSG9070

## (undated) DEVICE — SYRINGE IRRIG 60ML SFT PLIABLE BLB EZ TO GRP 1 HND USE W/

## (undated) DEVICE — GAUZE,PACKING STRIP,IODOFORM,1/2"X5YD,ST: Brand: CURAD

## (undated) DEVICE — GLOVE,SURG,SENSICARE,ALOE,LF,PF,7: Brand: MEDLINE

## (undated) DEVICE — GLOVE,SURG,SENSICARE SLT,LF,PF,7.5: Brand: MEDLINE

## (undated) DEVICE — GLOVE,SURG,SENSICARE SLT,LF,PF,7: Brand: MEDLINE

## (undated) DEVICE — ELECTRODE PT RET AD L9FT HI MOIST COND ADH HYDRGEL CORDED

## (undated) DEVICE — GLOVE SURG SZ 7 L12IN FNGR THK79MIL GRN LTX FREE

## (undated) DEVICE — SUTURE NONABSORBABLE MONOFILAMENT 3-0 PS-1 18 IN BLK ETHILON 1663H

## (undated) DEVICE — SOLUTION IRRIG 1000ML 0.9% SOD CHL USP POUR PLAS BTL

## (undated) DEVICE — SUTURE VICRYL + SZ 3-0 L18IN ABSRB UD SH 1/2 CIR TAPERCUT NDL VCP864D

## (undated) DEVICE — SWAB ANTISEP BENZ TINCTURE

## (undated) DEVICE — UNDERPAD INCONT XL MESH PROTCT + DISP FOR MAT USE

## (undated) DEVICE — YANKAUER,BULB TIP,W/O VENT,RIGID,STERILE: Brand: MEDLINE

## (undated) DEVICE — SKIN PREP TRAY 4 COMPARTM TRAY: Brand: MEDLINE INDUSTRIES, INC.

## (undated) DEVICE — SURGICAL PROCEDURE PACK IV U-BAR

## (undated) DEVICE — GOWN,REINF,POLY,AURORA,XLNG/XXL,STRL: Brand: MEDLINE

## (undated) DEVICE — GAUZE,SPONGE,4"X4",8PLY,STRL,LF,10/TRAY: Brand: MEDLINE

## (undated) DEVICE — FLEX ADVANTAGE 3000CC: Brand: FLEX ADVANTAGE

## (undated) DEVICE — GAUZE,SPONGE,4"X4",16PLY,STRL,LF,10/TRAY: Brand: MEDLINE

## (undated) DEVICE — PILLOW POS AD L7IN R FOAM HD REST INTUB SLOT DISP

## (undated) DEVICE — MINOR SET UP PACK: Brand: MEDLINE INDUSTRIES, INC.

## (undated) DEVICE — Device

## (undated) DEVICE — GOWN,SIRUS,POLYRNF,BRTHSLV,XLN/XL,20/CS: Brand: MEDLINE

## (undated) DEVICE — CRADLE POS PRONE 24 X 5 X 3 IN ARM N COMPR NO CVR FOAM DISP